# Patient Record
Sex: FEMALE | Race: WHITE | NOT HISPANIC OR LATINO | Employment: OTHER | URBAN - METROPOLITAN AREA
[De-identification: names, ages, dates, MRNs, and addresses within clinical notes are randomized per-mention and may not be internally consistent; named-entity substitution may affect disease eponyms.]

---

## 2018-11-01 ENCOUNTER — OFFICE VISIT (OUTPATIENT)
Dept: MEDICAL GROUP | Facility: MEDICAL CENTER | Age: 83
End: 2018-11-01
Payer: MEDICARE

## 2018-11-01 VITALS
WEIGHT: 134 LBS | OXYGEN SATURATION: 96 % | SYSTOLIC BLOOD PRESSURE: 128 MMHG | HEART RATE: 60 BPM | HEIGHT: 65 IN | RESPIRATION RATE: 16 BRPM | DIASTOLIC BLOOD PRESSURE: 82 MMHG | TEMPERATURE: 97.7 F | BODY MASS INDEX: 22.33 KG/M2

## 2018-11-01 DIAGNOSIS — I10 ESSENTIAL HYPERTENSION: ICD-10-CM

## 2018-11-01 DIAGNOSIS — R53.81 PHYSICAL DECONDITIONING: ICD-10-CM

## 2018-11-01 DIAGNOSIS — Z91.81 RISK FOR FALLS: ICD-10-CM

## 2018-11-01 DIAGNOSIS — Z76.89 ESTABLISHING CARE WITH NEW DOCTOR, ENCOUNTER FOR: ICD-10-CM

## 2018-11-01 DIAGNOSIS — Z23 NEED FOR VACCINATION: ICD-10-CM

## 2018-11-01 DIAGNOSIS — M17.0 PRIMARY OSTEOARTHRITIS OF BOTH KNEES: ICD-10-CM

## 2018-11-01 DIAGNOSIS — I87.2 VENOUS INSUFFICIENCY OF BOTH LOWER EXTREMITIES: ICD-10-CM

## 2018-11-01 DIAGNOSIS — Z86.79 HISTORY OF ATRIAL FIBRILLATION: ICD-10-CM

## 2018-11-01 DIAGNOSIS — R60.0 LOCALIZED EDEMA: ICD-10-CM

## 2018-11-01 DIAGNOSIS — Z86.69 H/O: BELL'S PALSY: ICD-10-CM

## 2018-11-01 DIAGNOSIS — N39.46 MIXED STRESS AND URGE URINARY INCONTINENCE: ICD-10-CM

## 2018-11-01 DIAGNOSIS — E03.9 ACQUIRED HYPOTHYROIDISM: ICD-10-CM

## 2018-11-01 PROCEDURE — G0008 ADMIN INFLUENZA VIRUS VAC: HCPCS | Performed by: INTERNAL MEDICINE

## 2018-11-01 PROCEDURE — 99203 OFFICE O/P NEW LOW 30 MIN: CPT | Mod: 25 | Performed by: INTERNAL MEDICINE

## 2018-11-01 PROCEDURE — 90662 IIV NO PRSV INCREASED AG IM: CPT | Performed by: INTERNAL MEDICINE

## 2018-11-01 RX ORDER — GABAPENTIN 300 MG/1
300 CAPSULE ORAL 2 TIMES DAILY
COMMUNITY
End: 2019-03-07 | Stop reason: SDUPTHER

## 2018-11-01 RX ORDER — LISINOPRIL 40 MG/1
40 TABLET ORAL DAILY
COMMUNITY
End: 2019-04-16 | Stop reason: SDUPTHER

## 2018-11-01 RX ORDER — METOPROLOL SUCCINATE 50 MG/1
50 TABLET, EXTENDED RELEASE ORAL DAILY
COMMUNITY
End: 2019-04-16 | Stop reason: SDUPTHER

## 2018-11-01 RX ORDER — LEVOTHYROXINE SODIUM 88 UG/1
88 TABLET ORAL
COMMUNITY
End: 2018-11-20

## 2018-11-01 RX ORDER — LATANOPROST 50 UG/ML
1 SOLUTION/ DROPS OPHTHALMIC NIGHTLY
COMMUNITY
End: 2018-11-01 | Stop reason: SDUPTHER

## 2018-11-01 RX ORDER — LATANOPROST 50 UG/ML
1 SOLUTION/ DROPS OPHTHALMIC DAILY
Qty: 1 BOTTLE | Refills: 3 | Status: SHIPPED | OUTPATIENT
Start: 2018-11-01 | End: 2019-06-11 | Stop reason: SDUPTHER

## 2018-11-01 RX ORDER — SIMVASTATIN 20 MG
20 TABLET ORAL NIGHTLY
COMMUNITY
End: 2018-11-01

## 2018-11-01 NOTE — PROGRESS NOTES
Subjective:   Tracie Arauz is a 87 y.o. female here today to establish care and             1. Establishing care with new doctor, encounter for    Patient moved from [place], [place] to live with her daughter 5 weeks ago.  She saw all of her physicians prior to leaving.  She lives in a one bedroom apartment, she does not drive.  She is fairly independent in that she is able to perform all of her ADLs without assistance, however she is not cooking or cleaning much.    2. Risk for falls    Patient has not fallen recently, however both her and her daughter states that she is unsteady on her feet.  She uses a cane to walk.  She used to attend senior exercise classes, however has not done so for the past couple of months.  It sounds like she sits on the couch most of the time.  They are both interested in a PT evaluation.    3. Primary osteoarthritis of both knees    Patient has fairly severe osteoarthritis in both knees.  She has never had surgery.  She was told that she should only take extra strength Tylenol by her previous physicians.    4. Essential hypertension    Patient has been taking Prinivil 40 mg daily, and metoprolol 50 mg daily for years.  Her blood pressure is controlled with this.    5. Acquired hypothyroidism    Patient has been on Synthroid 88 mcg daily for years, no history of thyroid surgery.    6. History of atrial fibrillation    Status post ablation about 10 years ago.  Has remained in sinus rhythm.    7. Mixed stress and urge urinary incontinence    Increasing problem over the last year, patient has both stress and urge incontinence.  She has never been evaluated for this.  She has to wear a pad chronically.    8. Localized edema    Increasing problem over the last several months.  She has puffy dependent edema that is worse when she travels and better when she elevates her legs.  No history of skin breakdown, kidney or liver disease    9. H/O: Bell's palsy    About 10 years ago, she feels like  her face might be a little more droopy the last couple of days    10. Physical deconditioning    See above    11. Venous insufficiency of both lower extremities    See above    12. Need for vaccination    Requesting flu shot      Current medicines (including changes today)  Current Outpatient Prescriptions   Medication Sig Dispense Refill   • levothyroxine (SYNTHROID) 88 MCG Tab Take 88 mcg by mouth Every morning on an empty stomach.     • lisinopril (PRINIVIL, ZESTRIL) 40 MG tablet Take 40 mg by mouth every day.     • metoprolol SR (TOPROL XL) 50 MG TABLET SR 24 HR Take 50 mg by mouth every day.     • gabapentin (NEURONTIN) 300 MG Cap Take 300 mg by mouth 2 Times a Day.     • latanoprost (XALATAN) 0.005 % Solution Place 1 Drop in both eyes every day. 1 Bottle 3     No current facility-administered medications for this visit.      She  has no past medical history on file.    Social History     Social History   • Marital status:      Spouse name: N/A   • Number of children: N/A   • Years of education: N/A     Social History Main Topics   • Smoking status: Former Smoker     Packs/day: 0.50     Quit date: 11/1/1985   • Smokeless tobacco: Never Used   • Alcohol use No   • Drug use: Unknown   • Sexual activity: No     Other Topics Concern   • Not on file     Social History Narrative   • No narrative on file     No family history on file.    ROS       - Constitutional: Negative for fever, chills, unexpected weight change, and fatigue/generalized weakness.     - HEENT: Negative for headaches, vision changes      - Respiratory: Negative for cough, Shortness of breath    - Cardiovascular: Negative for chest pain, positive chronic lower extremity edema  - Gastrointestinal: Negative for heartburn,  abdominal pain,  diarrhea, constipation     - Genitourinary: Negative for dysuria , positive urinary incontinence    - Musculoskeletal: Negative for  back pain bilateral knee pain, chronic  - Skin: Negative for rash     -  Neurological: Negative for dizziness,  tremors, focal weakness .  Had thorough neurological workup in the past for unclear reasons, all negative   - Psychiatric/Behavioral: Negative for depression and memory loss.             Objective:     Blood pressure 128/82, pulse 60, temperature 36.5 °C (97.7 °F), temperature source Temporal, resp. rate 16, SpO2 96 %, not currently breastfeeding. There is no height or weight on file to calculate BMI.   Physical Exam:  Constitutional: Alert, no distress.  Skin: Warm, dry, good turgor, no rashes in visible areas.  Eye: Equal, round and reactive, conjunctiva clear, lids normal.  ENMT: Lips without lesions, good dentition, oropharynx clear. Hearing grossly intact.  Neck: No masses, no thyromegaly. No cervical or supraclavicular lymphadenopathy  Respiratory: Unlabored respiratory effort, lungs clear to auscultation, no wheezes, no rhonchi.  Cardiovascular: Regular rate and rhythm, without murmur, no edema.  Abdomen: Soft, non-tender, no masses, no hepatosplenomegaly.  Extremities: Mild dependent rubor, 1-2+ puffy ankle and pedal edema pitting.  Bilateral arthritic changes of her knees  Psych: Alert and oriented x3, normal affect and mood. Insight and judgment good            Assessment and Plan:   The following treatment plan was discussed    1. Establishing care with new doctor, encounter for    Patient no longer requires mammograms, colonoscopy, Pap smears.  We discussed that she no longer needs to take her simvastatin for cholesterol lowering as primary prevention.  She is agreeable to discontinue    2. Risk for falls      - REFERRAL TO PHYSICAL THERAPY Reason for Therapy: Eval/Treat/Report    3. Primary osteoarthritis of both knees    We discussed exercising Tylenol, she also may investigate CBD oil or edible marijuana    4. Essential hypertension    -Controlled on current regimen  - COMP METABOLIC PANEL; Future    5. Acquired hypothyroidism      - TSH WITH REFLEX TO FT4;  Future    6. History of atrial fibrillation    No intervention or evaluation needed    7. Mixed stress and urge urinary incontinence      - REFERRAL TO UROGYNECOLOGY    8. Localized edema    I believe this is secondary to venous insufficiency, we will rule out other cause  - TSH WITH REFLEX TO FT4; Future  - CBC WITHOUT DIFFERENTIAL; Future    9. H/O: Bell's palsy        10. Physical deconditioning      - REFERRAL TO PHYSICAL THERAPY Reason for Therapy: Eval/Treat/Report    11. Venous insufficiency of both lower extremities    Patient has compression stockings at home, recommend that she use them    12. Need for vaccination    -Patient is up-to-date with her pneumonia vaccines, unfortunately forgot to enter them into chart.  - INFLUENZA VACCINE, HIGH DOSE (65+ ONLY)      Followup: Return in about 4 weeks (around 11/29/2018).

## 2018-11-19 ENCOUNTER — HOSPITAL ENCOUNTER (OUTPATIENT)
Dept: LAB | Facility: MEDICAL CENTER | Age: 83
End: 2018-11-19
Attending: INTERNAL MEDICINE
Payer: MEDICARE

## 2018-11-19 DIAGNOSIS — E03.9 ACQUIRED HYPOTHYROIDISM: ICD-10-CM

## 2018-11-19 DIAGNOSIS — R60.0 LOCALIZED EDEMA: ICD-10-CM

## 2018-11-19 DIAGNOSIS — I10 ESSENTIAL HYPERTENSION: ICD-10-CM

## 2018-11-19 LAB
ALBUMIN SERPL BCP-MCNC: 4.1 G/DL (ref 3.2–4.9)
ALBUMIN/GLOB SERPL: 1.5 G/DL
ALP SERPL-CCNC: 77 U/L (ref 30–99)
ALT SERPL-CCNC: 9 U/L (ref 2–50)
ANION GAP SERPL CALC-SCNC: 10 MMOL/L (ref 0–11.9)
AST SERPL-CCNC: 17 U/L (ref 12–45)
BILIRUB SERPL-MCNC: 0.3 MG/DL (ref 0.1–1.5)
BUN SERPL-MCNC: 22 MG/DL (ref 8–22)
CALCIUM SERPL-MCNC: 8.8 MG/DL (ref 8.5–10.5)
CHLORIDE SERPL-SCNC: 109 MMOL/L (ref 96–112)
CO2 SERPL-SCNC: 22 MMOL/L (ref 20–33)
CREAT SERPL-MCNC: 0.95 MG/DL (ref 0.5–1.4)
ERYTHROCYTE [DISTWIDTH] IN BLOOD BY AUTOMATED COUNT: 49 FL (ref 35.9–50)
GLOBULIN SER CALC-MCNC: 2.7 G/DL (ref 1.9–3.5)
GLUCOSE SERPL-MCNC: 79 MG/DL (ref 65–99)
HCT VFR BLD AUTO: 43 % (ref 37–47)
HGB BLD-MCNC: 12.9 G/DL (ref 12–16)
MCH RBC QN AUTO: 29.2 PG (ref 27–33)
MCHC RBC AUTO-ENTMCNC: 30 G/DL (ref 33.6–35)
MCV RBC AUTO: 97.3 FL (ref 81.4–97.8)
PLATELET # BLD AUTO: 294 K/UL (ref 164–446)
PMV BLD AUTO: 10.4 FL (ref 9–12.9)
POTASSIUM SERPL-SCNC: 3.8 MMOL/L (ref 3.6–5.5)
PROT SERPL-MCNC: 6.8 G/DL (ref 6–8.2)
RBC # BLD AUTO: 4.42 M/UL (ref 4.2–5.4)
SODIUM SERPL-SCNC: 141 MMOL/L (ref 135–145)
WBC # BLD AUTO: 5.5 K/UL (ref 4.8–10.8)

## 2018-11-19 PROCEDURE — 84443 ASSAY THYROID STIM HORMONE: CPT

## 2018-11-19 PROCEDURE — 84439 ASSAY OF FREE THYROXINE: CPT

## 2018-11-19 PROCEDURE — 80053 COMPREHEN METABOLIC PANEL: CPT

## 2018-11-19 PROCEDURE — 85027 COMPLETE CBC AUTOMATED: CPT

## 2018-11-19 PROCEDURE — 36415 COLL VENOUS BLD VENIPUNCTURE: CPT

## 2018-11-20 ENCOUNTER — TELEPHONE (OUTPATIENT)
Dept: MEDICAL GROUP | Facility: MEDICAL CENTER | Age: 83
End: 2018-11-20

## 2018-11-20 LAB
T4 FREE SERPL-MCNC: 0.51 NG/DL (ref 0.53–1.43)
TSH SERPL DL<=0.005 MIU/L-ACNC: 100.26 UIU/ML (ref 0.38–5.33)

## 2018-11-20 RX ORDER — LEVOTHYROXINE SODIUM 0.12 MG/1
125 TABLET ORAL
Qty: 90 TAB | Refills: 1 | Status: SHIPPED | OUTPATIENT
Start: 2018-11-20 | End: 2018-12-13 | Stop reason: SDUPTHER

## 2018-11-20 NOTE — PROGRESS NOTES
Tyler Hodges,  Your lab work shows that your thyroid is significantly under replaced.  We should increase the dose of your thyroid medication.  You should be taking this on an empty stomach in the morning prior to breakfast.  I will call a different strength in to your pharmacy.  See you next week.  Betty Cedeno M.D. covering for Betty Cedeno M.D.

## 2018-11-29 ENCOUNTER — APPOINTMENT (OUTPATIENT)
Dept: MEDICAL GROUP | Facility: MEDICAL CENTER | Age: 83
End: 2018-11-29
Payer: MEDICARE

## 2018-12-13 ENCOUNTER — OFFICE VISIT (OUTPATIENT)
Dept: MEDICAL GROUP | Facility: MEDICAL CENTER | Age: 83
End: 2018-12-13
Payer: MEDICARE

## 2018-12-13 VITALS
TEMPERATURE: 97.9 F | HEART RATE: 64 BPM | BODY MASS INDEX: 22.59 KG/M2 | WEIGHT: 135.6 LBS | HEIGHT: 65 IN | DIASTOLIC BLOOD PRESSURE: 90 MMHG | OXYGEN SATURATION: 97 % | SYSTOLIC BLOOD PRESSURE: 150 MMHG

## 2018-12-13 DIAGNOSIS — E03.9 ACQUIRED HYPOTHYROIDISM: ICD-10-CM

## 2018-12-13 DIAGNOSIS — R53.81 PHYSICAL DECONDITIONING: ICD-10-CM

## 2018-12-13 PROCEDURE — 99213 OFFICE O/P EST LOW 20 MIN: CPT | Performed by: INTERNAL MEDICINE

## 2018-12-13 RX ORDER — LEVOTHYROXINE SODIUM 0.12 MG/1
125 TABLET ORAL
Qty: 90 TAB | Refills: 1 | Status: SHIPPED | OUTPATIENT
Start: 2018-12-13 | End: 2019-06-11 | Stop reason: SDUPTHER

## 2018-12-13 ASSESSMENT — PATIENT HEALTH QUESTIONNAIRE - PHQ9
5. POOR APPETITE OR OVEREATING: 0 - NOT AT ALL
CLINICAL INTERPRETATION OF PHQ2 SCORE: 3
SUM OF ALL RESPONSES TO PHQ QUESTIONS 1-9: 9

## 2018-12-14 NOTE — PROGRESS NOTES
Chief Complaint   Patient presents with   • Results     labs     Chief complaint: One month follow-up of new patient visit, hypothyroidism.    HISTORY OF PRESENT ILLNESS: Patient is a 87 y.o. female patient who presents today to discuss the evaluation and management of:          1. Acquired hypothyroidism    Results for EDIL ZIMMER (MRN [id number]) as of 12/13/2018 17:53   Ref. Range 11/19/2018 13:09   TSH Latest Ref Range: 0.380 - 5.330 uIU/mL 100.260 (H)   Free T-4 Latest Ref Range: 0.53 - 1.43 ng/dL 0.51 (L)     Patient has been taking Synthroid 88 mcg daily.  She states she has been compliant and takes it on an empty stomach in the morning.  She does have quite a few left in her pill bottle making compliance questionable in my mind.  I had called in increased dose to her pharmacy, however they were not notified and did not pick it up.  Patient is complaining of significant fatigue.    2. Physical deconditioning    Since moving from [place] a couple months ago, patient has not been able to go out much.  She lives in her own small apartment and does not drive.  Her daughter is looking into resources in the community for water aerobics, senior classes, etc.  She has form she would like for me to fill out for [employer] placard and senior transportation Access.        Patient Active Problem List    Diagnosis Date Noted   • Risk for falls 11/01/2018   • Primary osteoarthritis of both knees 11/01/2018   • Essential hypertension 11/01/2018   • Acquired hypothyroidism 11/01/2018   • History of atrial fibrillation 11/01/2018   • Mixed stress and urge urinary incontinence 11/01/2018   • Localized edema 11/01/2018   • H/O: Bell's palsy 11/01/2018   • Physical deconditioning 11/01/2018   • Venous insufficiency of both lower extremities 11/01/2018        Allergies:Duricef [cefadroxil]    Current meds including changes today  Current Outpatient Prescriptions   Medication Sig Dispense Refill   • levothyroxine (SYNTHROID) 125 MCG Tab  "Take 1 Tab by mouth Every morning on an empty stomach. 90 Tab 1   • lisinopril (PRINIVIL, ZESTRIL) 40 MG tablet Take 40 mg by mouth every day.     • metoprolol SR (TOPROL XL) 50 MG TABLET SR 24 HR Take 50 mg by mouth every day.     • gabapentin (NEURONTIN) 300 MG Cap Take 300 mg by mouth 2 Times a Day.     • latanoprost (XALATAN) 0.005 % Solution Place 1 Drop in both eyes every day. 1 Bottle 3     No current facility-administered medications for this visit.      Social History   Substance Use Topics   • Smoking status: Former Smoker     Packs/day: 0.50     Quit date: 11/1/1985   • Smokeless tobacco: Never Used   • Alcohol use No     Social History     Social History Narrative   • No narrative on file       No family history on file.    Review of Systems:  No chest pain, No shortness of breath, No dyspnea on exertion  Gastrointestinal ROS: No abdominal pain, No nausea, vomiting, diarrhea, or constipation        Exam:      Blood pressure 150/90, pulse 64, temperature 36.6 °C (97.9 °F), temperature source Temporal, height 1.638 m (5' 4.5\"), weight 61.5 kg (135 lb 9.6 oz), SpO2 97 %, not currently breastfeeding.  General:  Well nourished, well developed female in NAD affect and mood within normal limits  Head is grossly normal.  Neck: Supple without adenopathy  Pulmonary: Clear to ausculation.  Normal effort. No rales, rhonchi, or wheezing.  Cardiovascular: Regular rate and rhythm without murmur.   Extremities: no clubbing, cyanosis, or edema.  Mild ankle edema left greater than right no pitting  Neuro: moves all extremities symmetrically    Please note that this dictation was created using voice recognition software. I have made every reasonable attempt to correct obvious errors, but I expect that there are errors of grammar and possibly content that I did not discover before finalizing the note.    Assessment/Plan:  1. Acquired hypothyroidism    -Suggested patient get a pillbox so that she can place her pills out for " the week, less chance of missing doses.  - levothyroxine (SYNTHROID) 125 MCG Tab; Take 1 Tab by mouth Every morning on an empty stomach.  Dispense: 90 Tab; Refill: 1  - TSH WITH REFLEX TO FT4; Future in 6 weeks    2. Physical deconditioning    -[employer] form filled out for placard, patient walks with cane and is unable to walk greater than 50 feet without stopping to rest.  She was also referred to outpatient PT but this is on hold until she can obtain reliable transportation.    Followup: Return in about 3 months (around 3/13/2019).

## 2019-03-07 ENCOUNTER — OFFICE VISIT (OUTPATIENT)
Dept: MEDICAL GROUP | Facility: MEDICAL CENTER | Age: 84
End: 2019-03-07
Payer: MEDICARE

## 2019-03-07 VITALS
OXYGEN SATURATION: 98 % | SYSTOLIC BLOOD PRESSURE: 148 MMHG | TEMPERATURE: 97.8 F | HEART RATE: 60 BPM | BODY MASS INDEX: 22.92 KG/M2 | DIASTOLIC BLOOD PRESSURE: 82 MMHG | HEIGHT: 65 IN | RESPIRATION RATE: 16 BRPM

## 2019-03-07 DIAGNOSIS — M89.9 OSTEOPATHY: ICD-10-CM

## 2019-03-07 DIAGNOSIS — I87.2 VENOUS INSUFFICIENCY OF BOTH LOWER EXTREMITIES: ICD-10-CM

## 2019-03-07 DIAGNOSIS — I10 ESSENTIAL HYPERTENSION: ICD-10-CM

## 2019-03-07 DIAGNOSIS — E03.9 ACQUIRED HYPOTHYROIDISM: ICD-10-CM

## 2019-03-07 DIAGNOSIS — M17.0 PRIMARY OSTEOARTHRITIS OF BOTH KNEES: ICD-10-CM

## 2019-03-07 DIAGNOSIS — L97.912 ULCER OF RIGHT LOWER EXTREMITY WITH FAT LAYER EXPOSED (HCC): ICD-10-CM

## 2019-03-07 PROBLEM — R60.0 LOCALIZED EDEMA: Status: RESOLVED | Noted: 2018-11-01 | Resolved: 2019-03-07

## 2019-03-07 PROCEDURE — 99214 OFFICE O/P EST MOD 30 MIN: CPT | Performed by: INTERNAL MEDICINE

## 2019-03-07 RX ORDER — GABAPENTIN 300 MG/1
300 CAPSULE ORAL 3 TIMES DAILY
Qty: 90 CAP | Refills: 3 | Status: SHIPPED | OUTPATIENT
Start: 2019-03-07 | End: 2019-06-11 | Stop reason: SDUPTHER

## 2019-03-07 ASSESSMENT — PATIENT HEALTH QUESTIONNAIRE - PHQ9
5. POOR APPETITE OR OVEREATING: 0 - NOT AT ALL
CLINICAL INTERPRETATION OF PHQ2 SCORE: 3
SUM OF ALL RESPONSES TO PHQ QUESTIONS 1-9: 6

## 2019-03-08 NOTE — PROGRESS NOTES
Chief Complaint   Patient presents with   • Hypertension     follow up     Chief complaint: 3-month follow-up of hypertension, new leg wound    HISTORY OF PRESENT ILLNESS: Patient is a 87 y.o. female patient who presents today to discuss the evaluation and management of:          1. Acquired hypothyroidism    Patient had TSH of 100 in November 2018, compliance is uncertain, but her Synthroid dose was increased  to125 mcg daily.  She states she is taking it faithfully.  Her energy level has improved, and her weight has dropped a little.    2. Primary osteoarthritis of both knees    Patient is taking Neurontin 300 mg twice daily, she would like to increase it as she feels it is helpful.  She also takes Tylenol as needed.    3. Essential hypertension    Patient is on lisinopril 40 mg daily, and Toprol-XL 50 mg daily.  Her blood pressure is minimally elevated today.    4. Ulcer of right lower extremity with fat layer exposed (HCC)    Patient struck her right shin on her bed rail 2 weeks ago.  She initially had significant swelling and ecchymosis, this has decreased.  She has a open sore on her shin that she has been applying antibacterial ointment and an occlusive dressing.    5. Venous insufficiency of both lower extremities    Chronic venous insufficiency, she had saline injection many years ago.  She continues to have chronic puffy ankle and pedal edema    6. Osteopathy            Patient Active Problem List    Diagnosis Date Noted   • Ulcer of right lower extremity with fat layer exposed (HCC) 03/07/2019   • Osteopathy 03/07/2019   • Risk for falls 11/01/2018   • Primary osteoarthritis of both knees 11/01/2018   • Essential hypertension 11/01/2018   • Acquired hypothyroidism 11/01/2018   • History of atrial fibrillation 11/01/2018   • Mixed stress and urge urinary incontinence 11/01/2018   • H/O: Bell's palsy 11/01/2018   • Physical deconditioning 11/01/2018   • Venous insufficiency of both lower extremities  "11/01/2018        Allergies:Duricef [cefadroxil]    Current meds including changes today  Current Outpatient Prescriptions   Medication Sig Dispense Refill   • gabapentin (NEURONTIN) 300 MG Cap Take 1 Cap by mouth 3 times a day. 90 Cap 3   • levothyroxine (SYNTHROID) 125 MCG Tab Take 1 Tab by mouth Every morning on an empty stomach. 90 Tab 1   • lisinopril (PRINIVIL, ZESTRIL) 40 MG tablet Take 40 mg by mouth every day.     • metoprolol SR (TOPROL XL) 50 MG TABLET SR 24 HR Take 50 mg by mouth every day.     • latanoprost (XALATAN) 0.005 % Solution Place 1 Drop in both eyes every day. 1 Bottle 3     No current facility-administered medications for this visit.      Social History   Substance Use Topics   • Smoking status: Former Smoker     Packs/day: 0.50     Quit date: 11/1/1985   • Smokeless tobacco: Never Used   • Alcohol use No     Social History     Social History Narrative   • No narrative on file       No family history on file.    Review of Systems:  No chest pain, No shortness of breath, No dyspnea on exertion  Gastrointestinal ROS: No abdominal pain, No nausea, vomiting, diarrhea, or constipation        Exam:      Blood pressure 148/82, pulse 60, temperature 36.6 °C (97.8 °F), temperature source Temporal, resp. rate 16, height 1.638 m (5' 4.5\"), SpO2 98 %, not currently breastfeeding.  General:  Well nourished, well developed female in NAD affect and mood within normal limits  Head is grossly normal.  Neck: Supple without adenopathy  Pulmonary: Clear to ausculation.  Normal effort. No rales, rhonchi, or wheezing.  Cardiovascular: Regular rate and rhythm without murmur.   Extremities: 2+ ankle and pretibial edema with rubor and dilated varicose veins.  2 cm ulcer right shin with mildly purulent drainage.  No surrounding cellulitis  Neuro: moves all extremities symmetrically    Please note that this dictation was created using voice recognition software. I have made every reasonable attempt to correct obvious " errors, but I expect that there are errors of grammar and possibly content that I did not discover before finalizing the note.    Assessment/Plan:  1. Acquired hypothyroidism    Continue Synthroid 125 mcg  - TSH WITH REFLEX TO FT4; Future    2. Primary osteoarthritis of both knees    Okay to increase Neurontin to 3 times daily    3. Essential hypertension    Stable, continue current medication    4. Ulcer of right lower extremity with fat layer exposed (HCC)    Patient was advised she should apply nonocclusive dressing, and stop putting ointment on it so that it can dry and make a scab.  If he does not have improvement in it in the next 10 days, I recommend wound care.  She would need to have home health that she is unable to drive.    5. Venous insufficiency of both lower extremities    Patient has compression stockings at home, I recommend that she wear them following healing of her wound    6. Osteopathy    -No history of fracture or osteoporosis, however she is at risk for fall and would benefit from treatment if she was osteoporotic  - DS-BONE DENSITY STUDY (DEXA); Future    Followup: Return in about 3 months (around 6/7/2019).

## 2019-03-25 ENCOUNTER — HOSPITAL ENCOUNTER (OUTPATIENT)
Dept: RADIOLOGY | Facility: MEDICAL CENTER | Age: 84
End: 2019-03-25
Attending: INTERNAL MEDICINE
Payer: MEDICARE

## 2019-03-25 DIAGNOSIS — M89.9 OSTEOPATHY: ICD-10-CM

## 2019-03-25 PROCEDURE — 77080 DXA BONE DENSITY AXIAL: CPT

## 2019-03-26 ENCOUNTER — TELEPHONE (OUTPATIENT)
Dept: MEDICAL GROUP | Facility: MEDICAL CENTER | Age: 84
End: 2019-03-26

## 2019-03-26 RX ORDER — ALENDRONATE SODIUM 70 MG/1
70 TABLET ORAL
Qty: 4 TAB | Refills: 5 | Status: SHIPPED | OUTPATIENT
Start: 2019-03-26 | End: 2019-06-11 | Stop reason: SDUPTHER

## 2019-03-27 NOTE — PROGRESS NOTES
Tyler Hodges,  Your bone density scan shows that you have osteopenia with a high risk for fracture.  You may benefit from taking a medication such as Fosamax which is a pill you take once a week.  We can discuss when I see you in June, but in the meantime I will call this into your pharmacy so that you can start taking it if you are willing.  Betty Cedeno MD

## 2019-06-11 ENCOUNTER — OFFICE VISIT (OUTPATIENT)
Dept: MEDICAL GROUP | Facility: MEDICAL CENTER | Age: 84
End: 2019-06-11
Payer: MEDICARE

## 2019-06-11 VITALS
WEIGHT: 129 LBS | OXYGEN SATURATION: 96 % | RESPIRATION RATE: 14 BRPM | DIASTOLIC BLOOD PRESSURE: 80 MMHG | TEMPERATURE: 98.6 F | BODY MASS INDEX: 21.49 KG/M2 | HEIGHT: 65 IN | SYSTOLIC BLOOD PRESSURE: 132 MMHG | HEART RATE: 64 BPM

## 2019-06-11 DIAGNOSIS — M17.0 PRIMARY OSTEOARTHRITIS OF BOTH KNEES: ICD-10-CM

## 2019-06-11 DIAGNOSIS — I10 ESSENTIAL HYPERTENSION: ICD-10-CM

## 2019-06-11 DIAGNOSIS — H40.10X0 OPEN-ANGLE GLAUCOMA OF BOTH EYES, UNSPECIFIED GLAUCOMA STAGE, UNSPECIFIED OPEN-ANGLE GLAUCOMA TYPE: ICD-10-CM

## 2019-06-11 DIAGNOSIS — M85.852 OSTEOPENIA OF NECK OF LEFT FEMUR: ICD-10-CM

## 2019-06-11 DIAGNOSIS — E03.9 ACQUIRED HYPOTHYROIDISM: ICD-10-CM

## 2019-06-11 PROBLEM — L97.912 ULCER OF RIGHT LOWER EXTREMITY WITH FAT LAYER EXPOSED (HCC): Status: RESOLVED | Noted: 2019-03-07 | Resolved: 2019-06-11

## 2019-06-11 PROCEDURE — 99214 OFFICE O/P EST MOD 30 MIN: CPT | Performed by: INTERNAL MEDICINE

## 2019-06-11 RX ORDER — ALENDRONATE SODIUM 70 MG/1
70 TABLET ORAL
Qty: 12 TAB | Refills: 1 | Status: SHIPPED | OUTPATIENT
Start: 2019-06-11 | End: 2023-08-24

## 2019-06-11 RX ORDER — METOPROLOL SUCCINATE 50 MG/1
50 TABLET, EXTENDED RELEASE ORAL DAILY
Qty: 90 TAB | Refills: 2 | Status: SHIPPED | OUTPATIENT
Start: 2019-06-11 | End: 2022-07-13

## 2019-06-11 RX ORDER — GABAPENTIN 300 MG/1
300 CAPSULE ORAL 2 TIMES DAILY PRN
Qty: 180 CAP | Refills: 2 | Status: SHIPPED | OUTPATIENT
Start: 2019-06-11 | End: 2023-08-24

## 2019-06-11 RX ORDER — LISINOPRIL 40 MG/1
40 TABLET ORAL DAILY
Qty: 90 TAB | Refills: 2 | Status: SHIPPED | OUTPATIENT
Start: 2019-06-11 | End: 2022-07-13

## 2019-06-11 RX ORDER — LATANOPROST 50 UG/ML
1 SOLUTION/ DROPS OPHTHALMIC DAILY
Qty: 1 BOTTLE | Refills: 3 | Status: SHIPPED | OUTPATIENT
Start: 2019-06-11 | End: 2019-08-27 | Stop reason: SDUPTHER

## 2019-06-11 RX ORDER — LEVOTHYROXINE SODIUM 0.12 MG/1
125 TABLET ORAL
Qty: 90 TAB | Refills: 1 | Status: SHIPPED | OUTPATIENT
Start: 2019-06-11 | End: 2023-04-27

## 2019-06-12 NOTE — PROGRESS NOTES
Chief Complaint   Patient presents with   • Hypothyroidism     3 month follow up      Chief complaint: 3-month follow-up of hypothyroid, hypertension, arthritis  HISTORY OF PRESENT ILLNESS: Patient is a 87 y.o. female patient who presents today to discuss the evaluation and management of:    Patient is generally doing well, she has become more active in the community.  She is attending senior outings, and classes.  She has been taking public transportation or taxis.      1. Essential hypertension    Patient's blood pressure is controlled on lisinopril and metoprolol.  She is having no side effects from the medication.  No dizziness.  She has mild lower extremity edema which has been stable.    2. Acquired hypothyroidism    Patient was significantly hypothyroid at the time of her new patient visit in December 2018.  At that time, she was on 88 mcg of levothyroxine.  I did question her compliance, as she seemed to have a lot of pills in the bottle left over.  She currently is taking 125 mcg daily, and is due for a repeat TSH.    3. Primary osteoarthritis of both knees    Patient continues to complain of knee pain.  She has fairly severe osteoarthritis in both knees.  Her right knee is fairly swollen.  In the past, she saw a rheumatologist for joint injection.  She remains fairly inactive, but is going to start exercising in the pool at her apartment complex.  We discussed that if she wanted an injection, we would refer her to orthopedics, rheumatologist here are scarce and backed up.    4. Open-angle glaucoma of both eyes, unspecified glaucoma stage, unspecified open-angle glaucoma type    Patient is requesting a refill on her Xalatan.  She has not yet established with a local ophthalmologist.    5. Osteopenia of neck of left femur    Patient had DEXA scan done in March which revealed with T score -2.1 and left femur, her 10-year fracture risk score was 28%.  I started her on Fosamax 70 mg weekly, she has been  "compliant with this and has not had any side effects.        Patient Active Problem List    Diagnosis Date Noted   • Osteopenia of neck of left femur 03/07/2019   • Risk for falls 11/01/2018   • Primary osteoarthritis of both knees 11/01/2018   • Essential hypertension 11/01/2018   • Acquired hypothyroidism 11/01/2018   • History of atrial fibrillation 11/01/2018   • Mixed stress and urge urinary incontinence 11/01/2018   • H/O: Bell's palsy 11/01/2018   • Physical deconditioning 11/01/2018   • Venous insufficiency of both lower extremities 11/01/2018        Allergies:Duricef [cefadroxil]    Current meds including changes today  Current Outpatient Prescriptions   Medication Sig Dispense Refill   • alendronate (FOSAMAX) 70 MG Tab Take 1 Tab by mouth every 7 days. 12 Tab 1   • gabapentin (NEURONTIN) 300 MG Cap Take 1 Cap by mouth 2 times a day as needed. 180 Cap 2   • lisinopril (PRINIVIL, ZESTRIL) 40 MG tablet Take 1 Tab by mouth every day. 90 Tab 2   • metoprolol SR (TOPROL XL) 50 MG TABLET SR 24 HR Take 1 Tab by mouth every day. 90 Tab 2   • latanoprost (XALATAN) 0.005 % Solution Place 1 Drop in both eyes every day. 1 Bottle 3   • levothyroxine (SYNTHROID) 125 MCG Tab Take 1 Tab by mouth Every morning on an empty stomach. 90 Tab 1     No current facility-administered medications for this visit.      Social History   Substance Use Topics   • Smoking status: Former Smoker     Packs/day: 0.50     Quit date: 11/1/1985   • Smokeless tobacco: Never Used   • Alcohol use No     Social History     Social History Narrative   • No narrative on file       No family history on file.          Exam:      /80 (BP Location: Left arm, Patient Position: Sitting, BP Cuff Size: Adult)   Pulse 64   Temp 37 °C (98.6 °F) (Temporal)   Resp 14   Ht 1.638 m (5' 4.5\")   Wt 58.5 kg (129 lb)   SpO2 96%   General:  Well nourished, well developed female in NAD affect and mood within normal limits.  Pleasant, alert  Head is grossly " normal.  Neck: Supple without adenopathy  Pulmonary: Clear to ausculation.  Normal effort. No rales, rhonchi, or wheezing.  Cardiovascular: Regular rate and rhythm without murmur.   Extremities: no clubbing, cyanosis, 1+ puffy ankle and pretibial edema.  Scar from healed wound from several months ago.  Both knees with swelling, and deformity due to OA.  Neuro: moves all extremities symmetrically    Please note that this dictation was created using voice recognition software. I have made every reasonable attempt to correct obvious errors, but I expect that there are errors of grammar and possibly content that I did not discover before finalizing the note.    Assessment/Plan:  1. Essential hypertension    -BP controlled, continue current regimen  - lisinopril (PRINIVIL, ZESTRIL) 40 MG tablet; Take 1 Tab by mouth every day.  Dispense: 90 Tab; Refill: 2  - metoprolol SR (TOPROL XL) 50 MG TABLET SR 24 HR; Take 1 Tab by mouth every day.  Dispense: 90 Tab; Refill: 2    2. Acquired hypothyroidism    -Will advise patient if she needs adjustment in her levothyroxine dose  - levothyroxine (SYNTHROID) 125 MCG Tab; Take 1 Tab by mouth Every morning on an empty stomach.  Dispense: 90 Tab; Refill: 1  - TSH WITH REFLEX TO FT4; Future    3. Primary osteoarthritis of both knees    -Patient advised that we could do referral to orthopedics if she desires evaluation for joint injection,   - gabapentin (NEURONTIN) 300 MG Cap; Take 1 Cap by mouth 2 times a day as needed.  Dispense: 180 Cap; Refill: 2    4. Open-angle glaucoma of both eyes, unspecified glaucoma stage, unspecified open-angle glaucoma type      - latanoprost (XALATAN) 0.005 % Solution; Place 1 Drop in both eyes every day.  Dispense: 1 Bottle; Refill: 3  - REFERRAL TO OPHTHALMOLOGY    5. Osteopenia of neck of left femur      - alendronate (FOSAMAX) 70 MG Tab; Take 1 Tab by mouth every 7 days.  Dispense: 12 Tab; Refill: 1    Followup: Recommend 6-month follow-up with new PCP,  patient may go to [facility], or return to this clinic.

## 2019-06-14 ENCOUNTER — HOSPITAL ENCOUNTER (OUTPATIENT)
Dept: LAB | Facility: MEDICAL CENTER | Age: 84
End: 2019-06-14
Attending: INTERNAL MEDICINE
Payer: MEDICARE

## 2019-06-14 DIAGNOSIS — E03.9 ACQUIRED HYPOTHYROIDISM: ICD-10-CM

## 2019-06-14 PROCEDURE — 84443 ASSAY THYROID STIM HORMONE: CPT

## 2019-06-14 PROCEDURE — 36415 COLL VENOUS BLD VENIPUNCTURE: CPT

## 2019-06-14 PROCEDURE — 84439 ASSAY OF FREE THYROXINE: CPT

## 2019-06-15 LAB
T4 FREE SERPL-MCNC: 0.72 NG/DL (ref 0.53–1.43)
TSH SERPL DL<=0.005 MIU/L-ACNC: 35.29 UIU/ML (ref 0.38–5.33)

## 2019-06-18 NOTE — PROGRESS NOTES
Hi Tracie,  Your thyroid level looks better,l but it looks like you are not taking your medication every day. Please try to make sure you are taking it every morning.  Betty Cedeno MD

## 2019-08-26 DIAGNOSIS — H40.10X0 OPEN-ANGLE GLAUCOMA OF BOTH EYES, UNSPECIFIED GLAUCOMA STAGE, UNSPECIFIED OPEN-ANGLE GLAUCOMA TYPE: ICD-10-CM

## 2019-08-27 RX ORDER — LATANOPROST 50 UG/ML
1 SOLUTION/ DROPS OPHTHALMIC DAILY
Qty: 1 BOTTLE | Refills: 1 | Status: SHIPPED | OUTPATIENT
Start: 2019-08-27 | End: 2023-08-24

## 2021-01-14 DIAGNOSIS — Z23 NEED FOR VACCINATION: ICD-10-CM

## 2022-07-05 PROBLEM — G31.84 MILD COGNITIVE IMPAIRMENT: Status: ACTIVE | Noted: 2022-07-05

## 2022-07-05 PROBLEM — I25.10 CAD (CORONARY ARTERY DISEASE): Status: ACTIVE | Noted: 2022-07-05

## 2022-07-05 PROBLEM — Z87.440 HISTORY OF UTI: Status: ACTIVE | Noted: 2022-07-05

## 2022-07-05 PROBLEM — Z91.81 PERSONAL HISTORY OF FALL: Status: ACTIVE | Noted: 2022-07-05

## 2022-07-05 PROBLEM — D68.318 CIRCULATING ANTICOAGULANT DISORDER (HCC): Status: ACTIVE | Noted: 2022-07-05

## 2022-07-05 PROBLEM — I48.11 LONGSTANDING PERSISTENT ATRIAL FIBRILLATION (HCC): Status: ACTIVE | Noted: 2022-07-05

## 2022-08-26 PROBLEM — K59.1 FUNCTIONAL DIARRHEA: Status: ACTIVE | Noted: 2022-08-26

## 2022-09-05 PROBLEM — F32.9 REACTIVE DEPRESSION: Status: ACTIVE | Noted: 2022-09-05

## 2022-12-27 PROBLEM — N30.00 ACUTE CYSTITIS WITHOUT HEMATURIA: Status: ACTIVE | Noted: 2022-12-27

## 2023-04-27 PROBLEM — H10.33 ACUTE CONJUNCTIVITIS OF BOTH EYES: Status: ACTIVE | Noted: 2023-04-27

## 2023-04-27 PROBLEM — L98.9 SKIN LESION OF NECK: Status: ACTIVE | Noted: 2023-04-27

## 2023-06-18 PROBLEM — H02.135 SENILE ECTROPION OF BOTH LOWER EYELIDS: Status: ACTIVE | Noted: 2023-06-18

## 2023-06-18 PROBLEM — H02.132 SENILE ECTROPION OF BOTH LOWER EYELIDS: Status: ACTIVE | Noted: 2023-06-18

## 2023-06-18 PROBLEM — N30.00 ACUTE CYSTITIS WITHOUT HEMATURIA: Status: RESOLVED | Noted: 2022-12-27 | Resolved: 2023-06-18

## 2023-06-18 PROBLEM — H10.33 ACUTE CONJUNCTIVITIS OF BOTH EYES: Status: RESOLVED | Noted: 2023-04-27 | Resolved: 2023-06-18

## 2023-08-09 ENCOUNTER — APPOINTMENT (OUTPATIENT)
Dept: RADIOLOGY | Facility: MEDICAL CENTER | Age: 88
End: 2023-08-09
Attending: STUDENT IN AN ORGANIZED HEALTH CARE EDUCATION/TRAINING PROGRAM
Payer: MEDICARE

## 2023-08-09 ENCOUNTER — HOSPITAL ENCOUNTER (EMERGENCY)
Facility: MEDICAL CENTER | Age: 88
End: 2023-08-10
Attending: STUDENT IN AN ORGANIZED HEALTH CARE EDUCATION/TRAINING PROGRAM
Payer: MEDICARE

## 2023-08-09 DIAGNOSIS — N30.01 ACUTE CYSTITIS WITH HEMATURIA: ICD-10-CM
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
## 2023-08-09 DIAGNOSIS — M25.531 RIGHT WRIST PAIN: ICD-10-CM

## 2023-08-09 LAB
ALBUMIN SERPL BCP-MCNC: 4 G/DL (ref 3.2–4.9)
ALBUMIN/GLOB SERPL: 1.3 G/DL
ALP SERPL-CCNC: 101 U/L (ref 30–99)
ALT SERPL-CCNC: 14 U/L (ref 2–50)
ANION GAP SERPL CALC-SCNC: 11 MMOL/L (ref 7–16)
APPEARANCE UR: ABNORMAL
AST SERPL-CCNC: 17 U/L (ref 12–45)
BACTERIA #/AREA URNS HPF: ABNORMAL /HPF
BASOPHILS # BLD AUTO: 0.2 % (ref 0–1.8)
BASOPHILS # BLD: 0.02 K/UL (ref 0–0.12)
BILIRUB SERPL-MCNC: 0.4 MG/DL (ref 0.1–1.5)
BILIRUB UR QL STRIP.AUTO: NEGATIVE
BUN SERPL-MCNC: 32 MG/DL (ref 8–22)
CALCIUM ALBUM COR SERPL-MCNC: 9.2 MG/DL (ref 8.5–10.5)
CALCIUM SERPL-MCNC: 9.2 MG/DL (ref 8.5–10.5)
CHLORIDE SERPL-SCNC: 104 MMOL/L (ref 96–112)
CO2 SERPL-SCNC: 23 MMOL/L (ref 20–33)
COLOR UR: YELLOW
CREAT SERPL-MCNC: 1.11 MG/DL (ref 0.5–1.4)
EOSINOPHIL # BLD AUTO: 0.04 K/UL (ref 0–0.51)
EOSINOPHIL NFR BLD: 0.3 % (ref 0–6.9)
EPI CELLS #/AREA URNS HPF: NEGATIVE /HPF
ERYTHROCYTE [DISTWIDTH] IN BLOOD BY AUTOMATED COUNT: 44 FL (ref 35.9–50)
GFR SERPLBLD CREATININE-BSD FMLA CKD-EPI: 47 ML/MIN/1.73 M 2
GLOBULIN SER CALC-MCNC: 3 G/DL (ref 1.9–3.5)
GLUCOSE SERPL-MCNC: 118 MG/DL (ref 65–99)
GLUCOSE UR STRIP.AUTO-MCNC: NEGATIVE MG/DL
HCT VFR BLD AUTO: 37.3 % (ref 37–47)
HGB BLD-MCNC: 12 G/DL (ref 12–16)
HYALINE CASTS #/AREA URNS LPF: ABNORMAL /LPF
IMM GRANULOCYTES # BLD AUTO: 0.06 K/UL (ref 0–0.11)
IMM GRANULOCYTES NFR BLD AUTO: 0.5 % (ref 0–0.9)
KETONES UR STRIP.AUTO-MCNC: ABNORMAL MG/DL
LEUKOCYTE ESTERASE UR QL STRIP.AUTO: ABNORMAL
LYMPHOCYTES # BLD AUTO: 1.07 K/UL (ref 1–4.8)
LYMPHOCYTES NFR BLD: 8.9 % (ref 22–41)
MCH RBC QN AUTO: 30.6 PG (ref 27–33)
MCHC RBC AUTO-ENTMCNC: 32.2 G/DL (ref 32.2–35.5)
MCV RBC AUTO: 95.2 FL (ref 81.4–97.8)
MICRO URNS: ABNORMAL
MONOCYTES # BLD AUTO: 0.63 K/UL (ref 0–0.85)
MONOCYTES NFR BLD AUTO: 5.3 % (ref 0–13.4)
NEUTROPHILS # BLD AUTO: 10.17 K/UL (ref 1.82–7.42)
NEUTROPHILS NFR BLD: 84.8 % (ref 44–72)
NITRITE UR QL STRIP.AUTO: NEGATIVE
NRBC # BLD AUTO: 0 K/UL
NRBC BLD-RTO: 0 /100 WBC (ref 0–0.2)
PH UR STRIP.AUTO: 7 [PH] (ref 5–8)
PLATELET # BLD AUTO: 333 K/UL (ref 164–446)
PMV BLD AUTO: 9.7 FL (ref 9–12.9)
POTASSIUM SERPL-SCNC: 4.3 MMOL/L (ref 3.6–5.5)
PROT SERPL-MCNC: 7 G/DL (ref 6–8.2)
PROT UR QL STRIP: 100 MG/DL
RBC # BLD AUTO: 3.92 M/UL (ref 4.2–5.4)
RBC # URNS HPF: ABNORMAL /HPF
RBC UR QL AUTO: ABNORMAL
SODIUM SERPL-SCNC: 138 MMOL/L (ref 135–145)
SP GR UR STRIP.AUTO: 1.02
UROBILINOGEN UR STRIP.AUTO-MCNC: 0.2 MG/DL
WBC # BLD AUTO: 12 K/UL (ref 4.8–10.8)
WBC #/AREA URNS HPF: ABNORMAL /HPF

## 2023-08-09 PROCEDURE — 99285 EMERGENCY DEPT VISIT HI MDM: CPT

## 2023-08-09 PROCEDURE — 87086 URINE CULTURE/COLONY COUNT: CPT

## 2023-08-09 PROCEDURE — 87186 SC STD MICRODIL/AGAR DIL: CPT

## 2023-08-09 PROCEDURE — 85025 COMPLETE CBC W/AUTO DIFF WBC: CPT

## 2023-08-09 PROCEDURE — 72125 CT NECK SPINE W/O DYE: CPT

## 2023-08-09 PROCEDURE — 80053 COMPREHEN METABOLIC PANEL: CPT

## 2023-08-09 PROCEDURE — 73110 X-RAY EXAM OF WRIST: CPT | Mod: RT

## 2023-08-09 PROCEDURE — 72170 X-RAY EXAM OF PELVIS: CPT

## 2023-08-09 PROCEDURE — 73130 X-RAY EXAM OF HAND: CPT | Mod: RT

## 2023-08-09 PROCEDURE — 81001 URINALYSIS AUTO W/SCOPE: CPT

## 2023-08-09 PROCEDURE — 36415 COLL VENOUS BLD VENIPUNCTURE: CPT

## 2023-08-09 PROCEDURE — 93005 ELECTROCARDIOGRAM TRACING: CPT | Performed by: STUDENT IN AN ORGANIZED HEALTH CARE EDUCATION/TRAINING PROGRAM

## 2023-08-09 PROCEDURE — 700102 HCHG RX REV CODE 250 W/ 637 OVERRIDE(OP): Performed by: STUDENT IN AN ORGANIZED HEALTH CARE EDUCATION/TRAINING PROGRAM

## 2023-08-09 PROCEDURE — 70450 CT HEAD/BRAIN W/O DYE: CPT

## 2023-08-09 PROCEDURE — A9270 NON-COVERED ITEM OR SERVICE: HCPCS | Performed by: STUDENT IN AN ORGANIZED HEALTH CARE EDUCATION/TRAINING PROGRAM

## 2023-08-09 PROCEDURE — 87077 CULTURE AEROBIC IDENTIFY: CPT

## 2023-08-09 PROCEDURE — 70486 CT MAXILLOFACIAL W/O DYE: CPT

## 2023-08-09 RX ORDER — AMOXICILLIN AND CLAVULANATE POTASSIUM 500; 125 MG/1; MG/1
1 TABLET, FILM COATED ORAL ONCE
Status: COMPLETED | OUTPATIENT
Start: 2023-08-09 | End: 2023-08-09

## 2023-08-09 RX ORDER — AMOXICILLIN AND CLAVULANATE POTASSIUM 500; 125 MG/1; MG/1
1 TABLET, FILM COATED ORAL 2 TIMES DAILY
Qty: 14 TABLET | Refills: 0 | Status: ACTIVE | OUTPATIENT
Start: 2023-08-09 | End: 2023-08-16

## 2023-08-09 RX ADMIN — AMOXICILLIN AND CLAVULANATE POTASSIUM 1 TABLET: 500; 125 TABLET, FILM COATED ORAL at 23:26

## 2023-08-09 ASSESSMENT — FIBROSIS 4 INDEX: FIB4 SCORE: 1.36

## 2023-08-09 NOTE — LETTER
8/17/2023               Tracie Arauz  [address]  [address]  [place] [address]        Dear Tracie (MR#[id number])    This letter is sent in regards to your recent visit to the [facility] Emergency Department on 8/9/2023. During the visit, tests were performed to assist the physician in your medical diagnosis. A review of your tests requires that we notify you of the following:    Your urine culture was POSITIVE for a bacteria called Proteus mirabilis. The antibiotic prescribed for you (amoxicillin/clavulanate) should be active to treat this bacteria. It is important that you continue taking your antibiotic until it is finished.     Please feel free to contact me at the number below if you have any questions or concerns. Thank you for your cooperation in the matter.    Sincerely,  ED Culture Follow-Up Staff  Nathaniel Villasenor, PharmD    [facility], Emergency Department  [address] [place] [id number] [phone] (ED Culture Line)

## 2023-08-10 VITALS
HEIGHT: 63 IN | HEART RATE: 71 BPM | TEMPERATURE: 98.4 F | SYSTOLIC BLOOD PRESSURE: 173 MMHG | BODY MASS INDEX: 20.38 KG/M2 | RESPIRATION RATE: 18 BRPM | OXYGEN SATURATION: 95 % | WEIGHT: 115 LBS | DIASTOLIC BLOOD PRESSURE: 73 MMHG

## 2023-08-10 LAB — EKG IMPRESSION: NORMAL

## 2023-08-10 NOTE — ED NOTES
PIV removed, Pt's brief changed and reg-care provided.  Fall precautions in place with bed alarm in place for patient safety. Gurney in lowest position and call light within reach.

## 2023-08-10 NOTE — DISCHARGE PLANNING
Medical Social Work     CAROL received a text from the RN requesting CAROL assistance with [facility] transport back [facility] [address]. CAROL called [facility] and spoke with Eduarda and advised her we are sending the pt back, she stated she will be waiting for the pt.     CAROL faxed a PCS form to [facility] and set up transport with Aileen for 0200. RN aware of transport time.

## 2023-08-10 NOTE — ED NOTES
Patient resting comfortably in bed with steady and unlabored breathing on room air.  Fall precautions in place with bed alarm in place for patient safety. Gurney in lowest position and call light within reach.

## 2023-08-10 NOTE — ED NOTES
Report given to ANA. Pt transported back to SNF via ambulance transportation. Discharged instructions, POLST, and belongings transported with patient.

## 2023-08-10 NOTE — ED NOTES
Rounded on patient, patient resting comfortably in bed with steady and unlabored breathing on room air.  Fall precautions in place with bed alarm in place for patient safety. Gurney in lowest position and call light within reach.

## 2023-08-10 NOTE — ED NOTES
Bedside report received from DARIN García. Pt returned from CT, Pt resting comfortably in bed with steady and unlabored breathing. Family at bedside and call light in reach. Fall risk precautions in place and bed alarm enforced for patient safety.

## 2023-08-10 NOTE — DISCHARGE INSTRUCTIONS
Tracie was seen in the ER after a fall. There is no evidence of trauma to her brain, neck, face, right wrist, right hand or pelvis. She is walking without difficulty with a walker. She was found to have a urinary tract infection therefore through discussion with pharmacy, she has been started on Augmentin. Please give her this twice a day for the next week. Return to the ER for fever, vomiting, worsening pain or any other concerns.

## 2023-08-10 NOTE — ED TRIAGE NOTES
"Chief Complaint   Patient presents with    GLF     MGLF, tripped over chair while walking without walker, R wrist pain, -head injury, +aspirin, hx multiple falls, dementia aox3 baseline        BIB [facility] for above complaint. DNR. Hx a fib, HTN, rhabdo. EMS vitals 164/92 HR 94 97% RA gcs 14    IV places, labs drawn.      BP (!) 182/79   Pulse 80   Temp 36.9 °C (98.5 °F) (Temporal)   Resp 16   Ht 1.6 m (5' 3\")   Wt 52.2 kg (115 lb)   SpO2 95%   BMI 20.37 kg/m²      "

## 2023-08-10 NOTE — ED PROVIDER NOTES
ED Provider Note    CHIEF COMPLAINT  Chief Complaint   Patient presents with    GLF     MGLF, tripped over chair while walking without walker, R wrist pain, -head injury, +aspirin, hx multiple falls, dementia aox3 baseline       EXTERNAL RECORDS REVIEWED  Outpatient Notes patient seen by her geriatric nurse practitioner for acquired hypothyroidism, coronary artery disease, hypertension, atrial fibrillation, mild cognitive impairment on 2023    HPI/ROS  LIMITATION TO HISTORY   Select: : None  OUTSIDE HISTORIAN(S):  Family youngest daughter    Tracie Arauz is a 89 y.o. female with history of atrial fibrillation on aspirin who presents after mechanical ground-level fall.  The patient lives at an assisted living facility as she has a history of dementia.  She needs a walker to walk but she frequently forgets to use it.  Today, she tripped while walking without her walker.  She denies hitting her head however she does have an abrasion to the right forehead.  She did suffer a fall a few weeks ago and has some bruising on the left side of her face and left arm from this fall.  She denies losing consciousness.  She states that that the only pain is in her right wrist.  She has no chest pain, shortness of breath, abdominal pain, nausea, vomiting, numbness, tingling, weakness.    PAST MEDICAL HISTORY   has a past medical history of Acute conjunctivitis of both eyes (2023) and Acute cystitis without hematuria (2022).    SURGICAL HISTORY   has a past surgical history that includes hysterectomy, total abdominal.    FAMILY HISTORY  History reviewed. No pertinent family history.    SOCIAL HISTORY  Social History     Tobacco Use    Smoking status: Former     Packs/day: 0.50     Types: Cigarettes     Quit date: 1985     Years since quittin.7    Smokeless tobacco: Never   Vaping Use    Vaping Use: Never used   Substance and Sexual Activity    Alcohol use: No    Drug use: Never    Sexual activity: Never  "      CURRENT MEDICATIONS  Home Medications       Reviewed by Raquel Perez R.N. (Registered Nurse) on 08/09/23 at 1807  Med List Status: Partial     Medication Last Dose Status   alendronate (FOSAMAX) 70 MG Tab  Active   aspirin EC (ECOTRIN) 81 MG Tablet Delayed Response  Active   CRANBERRY-VITAMIN C-D MANNOSE PO  Active   erythromycin 5 MG/GM Ointment  Active   gabapentin (NEURONTIN) 300 MG Cap  Active   latanoprost (XALATAN) 0.005 % Solution  Active   levothyroxine (SYNTHROID) 112 MCG Tab  Active   liothyronine (CYTOMEL) 5 MCG Tab  Active   lisinopril (PRINIVIL) 10 MG Tab  Active   metoprolol tartrate (LOPRESSOR) 50 MG Tab  Active   mirtazapine (REMERON) 7.5 MG tablet  Active   Multiple Vitamins-Minerals (QC MULTI-BRITTANY) Tab  Active   polyethyl glycol-propyl glycol (LUBRICATING EYE DROPS) 0.4-0.3 % Solution  Active   Probiotic, Lactobacillus, Cap  Active   sennosides-docusate sodium (SENOKOT-S) 8.6-50 MG tablet  Active                    ALLERGIES  Allergies   Allergen Reactions    Duricef [Cefadroxil]      \"Rash\"       PHYSICAL EXAM  VITAL SIGNS: BP (!) 182/79   Pulse 80   Temp 36.9 °C (98.5 °F) (Temporal)   Resp 16   Ht 1.6 m (5' 3\")   Wt 52.2 kg (115 lb)   SpO2 95%   BMI 20.37 kg/m²      Constitutional: Well developed, Well nourished, elderly  female, no acute distress  HEENT: Normocephalic,  external ears normal, pharynx pink,  Mucous  Membranes moist, No rhinorrhea or mucosal edema.  Old appearing ecchymosis to left side of face, small abrasion to right forehead.  No midface instability, no nasal septal hematoma, no intraoral trauma  Eyes: PERRL, EOMI, Conjunctiva normal, No discharge.   Neck: Normal range of motion, No tenderness, Supple, No stridor.   Cardiovascular: Regular Rate and Rhythm, No murmurs,  rubs, or gallops.   Thorax & Lungs: Lungs clear to auscultation bilaterally, No respiratory distress, No wheezes, rhales or rhonchi, No chest wall tenderness.   Abdomen: Bowel sounds " normal, Soft, non tender, non distended,  No pulsatile masses., no rebound guarding or peritoneal signs.   Skin: Warm, Dry, No erythema, No rash, old appearing ecchymosis to the left proximal humerus area.  Right wrist with area of ecchymosis to dorsal aspect of right wrist.  Old appearing ecchymosis to left and right shins  Back:  No CVA tenderness,  No spinal tenderness, bony crepitance step offs or instability.   Extremities: Equal, intact distal pulses, No cyanosis, clubbing or edema, tenderness to right distal radius, no snuffbox tenderness to palpation, no deformity noted.  Otherwise left upper extremity is nontender, bilateral lower extremities are nontender  Musculoskeletal: Good range of motion in all major joints. No tenderness to palpation or major deformities noted.   Neurologic: Alert & oriented x 3, Cranial nerves II-XII intact, Equal strength and sensation upper and lower extremities bilaterally,  No focal deficits noted.   Psychiatric: Affect normal, Judgment normal, Mood normal. No suicidal or homicidal ideation      DIAGNOSTIC STUDIES / PROCEDURES    LABS  Results for orders placed or performed during the hospital encounter of 08/09/23   CBC WITH DIFFERENTIAL   Result Value Ref Range    WBC 12.0 (H) 4.8 - 10.8 K/uL    RBC 3.92 (L) 4.20 - 5.40 M/uL    Hemoglobin 12.0 12.0 - 16.0 g/dL    Hematocrit 37.3 37.0 - 47.0 %    MCV 95.2 81.4 - 97.8 fL    MCH 30.6 27.0 - 33.0 pg    MCHC 32.2 32.2 - 35.5 g/dL    RDW 44.0 35.9 - 50.0 fL    Platelet Count 333 164 - 446 K/uL    MPV 9.7 9.0 - 12.9 fL    Neutrophils-Polys 84.80 (H) 44.00 - 72.00 %    Lymphocytes 8.90 (L) 22.00 - 41.00 %    Monocytes 5.30 0.00 - 13.40 %    Eosinophils 0.30 0.00 - 6.90 %    Basophils 0.20 0.00 - 1.80 %    Immature Granulocytes 0.50 0.00 - 0.90 %    Nucleated RBC 0.00 0.00 - 0.20 /100 WBC    Neutrophils (Absolute) 10.17 (H) 1.82 - 7.42 K/uL    Lymphs (Absolute) 1.07 1.00 - 4.80 K/uL    Monos (Absolute) 0.63 0.00 - 0.85 K/uL    Eos  (Absolute) 0.04 0.00 - 0.51 K/uL    Baso (Absolute) 0.02 0.00 - 0.12 K/uL    Immature Granulocytes (abs) 0.06 0.00 - 0.11 K/uL    NRBC (Absolute) 0.00 K/uL   COMP METABOLIC PANEL   Result Value Ref Range    Sodium 138 135 - 145 mmol/L    Potassium 4.3 3.6 - 5.5 mmol/L    Chloride 104 96 - 112 mmol/L    Co2 23 20 - 33 mmol/L    Anion Gap 11.0 7.0 - 16.0    Glucose 118 (H) 65 - 99 mg/dL    Bun 32 (H) 8 - 22 mg/dL    Creatinine 1.11 0.50 - 1.40 mg/dL    Calcium 9.2 8.5 - 10.5 mg/dL    Correct Calcium 9.2 8.5 - 10.5 mg/dL    AST(SGOT) 17 12 - 45 U/L    ALT(SGPT) 14 2 - 50 U/L    Alkaline Phosphatase 101 (H) 30 - 99 U/L    Total Bilirubin 0.4 0.1 - 1.5 mg/dL    Albumin 4.0 3.2 - 4.9 g/dL    Total Protein 7.0 6.0 - 8.2 g/dL    Globulin 3.0 1.9 - 3.5 g/dL    A-G Ratio 1.3 g/dL   URINALYSIS CULTURE, IF INDICATED    Specimen: Urine, Cath   Result Value Ref Range    Color Yellow     Character Cloudy (A)     Specific Gravity 1.018 <1.035    Ph 7.0 5.0 - 8.0    Glucose Negative Negative mg/dL    Ketones Trace (A) Negative mg/dL    Protein 100 (A) Negative mg/dL    Bilirubin Negative Negative    Urobilinogen, Urine 0.2 Negative    Nitrite Negative Negative    Leukocyte Esterase Large (A) Negative    Occult Blood Moderate (A) Negative    Micro Urine Req Microscopic    ESTIMATED GFR   Result Value Ref Range    GFR (CKD-EPI) 47 (A) >60 mL/min/1.73 m 2   URINE MICROSCOPIC (W/UA)   Result Value Ref Range    WBC Packed (A) /hpf    RBC 10-20 (A) /hpf    Bacteria Moderate (A) None /hpf    Epithelial Cells Negative /hpf    Hyaline Cast 0-2 /lpf   URINE CULTURE(NEW)    Specimen: Urine   Result Value Ref Range    Significant Indicator NEG     Source UR     Site -     Culture Result -    EKG (NOW)   Result Value Ref Range    Report       [facility] Emergency Dept.    Test Date:  2023-08-09  Pt Name:    EDIL ZIMMER           Department: ER  MRN:        [id number]                      Room:        09  Gender:      Female                       Technician: [id number]  :        1931-07-10                   Requested By:ALEXANDER MACK  Order #:    [id number]                    Reading MD:    Measurements  Intervals                                Axis  Rate:       71                           P:          68  MO:         179                          QRS:        -49  QRSD:       79                           T:          56  QT:         427  QTc:        465    Interpretive Statements  Sinus rhythm  Left anterior fascicular block  Consider left ventricular hypertrophy  Anterior Q waves, possibly due to LVH  ST elevation, consider inferior injury  Artifact in lead(s) I,II,III,aVR,aVL,aVF,V1,V2,V3,V4,V5,V6 and baseline  wander in lead(s) II  No previous ECG available for comparison     I have independently interpreted this EKG      RADIOLOGY  I have independently interpreted the diagnostic imaging associated with this visit and am waiting the final reading from the radiologist.   My preliminary interpretation is as follows: no ICH  Radiologist interpretation:   DX-PELVIS-1 OR 2 VIEWS   Final Result      1.  Osteopenia.      2.  No evidence of pelvic fracture and/or dislocation.      CT-CSPINE WITHOUT PLUS RECONS   Final Result      No acute abnormality identified.      CT-MAXILLOFACIAL W/O PLUS RECONS   Final Result      Negative maxillofacial/paranasal sinuses CT scan without contrast.      CT-HEAD W/O   Final Result      1. Cerebral atrophy.   2. White matter lucencies most consistent with small vessel ischemic change versus demyelination or gliosis.   3. Otherwise, Head CT without contrast with no acute findings. No evidence of acute cerebral infarction, hemorrhage or mass lesion.         DX-HAND 3+ RIGHT   Final Result      No radiographic evidence of acute traumatic injury.      DX-WRIST-COMPLETE 3+ RIGHT   Final Result      No radiographic evidence of acute traumatic injury.            COURSE & MEDICAL DECISION MAKING    ED  Observation Status? Yes; I am placing the patient in to an observation status due to a diagnostic uncertainty as well as therapeutic intensity. Patient placed in observation status at 6:12 AM, 8/09/2023.     Observation plan is as follows: labs, imaging, discussion with care facility, reassessment    8:28 PM patient reevaluated at bedside.  She is resting comfortably and would like to sleep.  Her youngest daughter is at bedside.  I provided her with an update.  At this point there is no evidence of acute traumatic injury on her imaging.  We are awaiting urine results.  Patient daughter feels comfortable if patient is discharged back to her facility.  She is going to leave as patient would like to sleep.    9:10 PM I received a call from nursing staff that the patient's assisted living facility had reached out to the emergency room requesting that we do an x-ray of her pelvis that they want to rule out any hip fracture.  The patient is not having any hip pain.  We will get a x-ray pelvis and then do an ambulation trial to see if we need to do any further imaging of her hips.    10:46 PM pelvic x-ray with no evidence of fracture.  Patient was able to ambulate with walker around emergency room with steady gait and had no complaint of hip pain.  Given negative x-ray and ability to ambulate without pain, I suspect this is unlikely to represent hip fracture.  I called the patient's nurse at assisted living facility and relayed this to her.  They are happy to welcome the patient back.  I did discuss that she was found to have a urinary tract infection on workup.  She requested that I send a prescription to [facility] which was done.  Patient will be given first dose of antibiotics here in the emergency room.  Will plan for Carlos a discharge home.    Upon Reevaluation, the patient's condition has: Improved; and will be discharged.    Patient discharged from ED Observation status at 000 (Time) 08/10/2023 (Date).     INITIAL  ASSESSMENT, COURSE AND PLAN  Care Narrative: This is a 89-year-old female who is on aspirin for treatment of atrial fibrillation who presents after mechanical ground-level fall that occurred at her assisted living facility.  The patient is supposed to use a walker to walk but frequently forgets to use her walker and tripped and fall.  She does have old bruising on her left side of face as well as left upper arm and bilateral legs which do show evidence of old falls.  She did not lose consciousness today.  She has no chest pain or shortness of breath.  She is nontoxic on appearance.  Her vital signs are stable without tachycardia or hypotension.  She is afebrile.    CT head and C-spine as well as maxillofacial images were obtained.  There are no acute fractures seen or intracranial hemorrhage or subluxation of the C-spine.  Right wrist x-ray was obtained and there is no evidence of acute fracture to the wrist or hand.  She has no snuffbox tenderness to raise concern for scaphoid fracture.  Labs were obtained and there is mild leukocytosis to 12,000 however patient has no other SIRS criteria such as tachycardia, tachypnea or hypotension therefore I doubt sepsis.  Electrolytes were within normal limits.  Renal function is normal.  UA was obtained and she has evidence of urinary tract infection with packed white blood cells, large leukocyte Estrase.  Patient does have a history of cephalosporin allergy therefore I did discuss with pharmacy regarding treatment options for her urinary tract infection.  They suggested Augmentin.  Patient has no systemic signs of illness from this urinary tract infection.  She was given first dose of Augmentin in the emergency room    As noted above, we did receive call from her care facility as they wanted us to obtain pelvic x-ray to look for hip fracture.  This was done and shows no acute fracture.  Patient is ambulatory and has no complaints of hip pain therefore I doubt hip fracture.   She otherwise has no back pain.  She is tolerating p.o.    I did relay the patient's plan of care with the patient's nurse at her assisted living facility.  Order for ambulance transfer back to her assisted living facility.  Recommend primary care follow-up in 2 days.  Strict ER return precautions were discussed.  Patient agreeable to discharge plan no further questions.    HTN/IDDM FOLLOW UP:  The patient is referred to a primary physician for blood pressure management, diabetic screening, and for all other preventive health concerns        DISPOSITION AND DISCUSSIONS  Discussion of management with other Q or appropriate source(s): Pharmacy regarding antibiotic selection for urinary tract infection given her history of cephalosporin allergy. Pharmacy recommends Augmentin      Patient discharged home in stable condition with prescription for Augmentin.  Strict ER return precautions were discussed.  Patient is agreeable to plan and no further questions.      FINAL DIAGNOSIS  1. Fall, initial encounter    2. Right wrist pain    3. Acute cystitis with hematuria         Electronically signed by: Ludy Snyder M.D., 8/9/2023 6:12 PM

## 2023-08-10 NOTE — ED NOTES
Performed road test with patient: Pt ambulated using walker with steady gait. RN was on standby, Pt denied pain, dizziness, nor did she appear unstable during ambulation. MD notified.

## 2023-08-12 LAB
BACTERIA UR CULT: ABNORMAL
BACTERIA UR CULT: ABNORMAL
SIGNIFICANT IND 70042: ABNORMAL
SITE SITE: ABNORMAL
SOURCE SOURCE: ABNORMAL

## 2023-08-18 NOTE — ED NOTES
ED Positive Culture Follow-up/Notification Note:    Date: 8/17/2023   Patient seen in the ED on 8/9/2023 for GLF. Patient denies losing consciousness and denies striking her head. Patient denies chest/abdominal pain, N/V, numbness/weakness. Patient reports only pain in R wrist.   Physical exam is notable for no CVA/spinal tenderness. In the ED patient was afebrile with stable vitals. WBC count 12. Imaging revealed no traumatic injury. Patient received a single dose of Augmentin in the ED.   1. Fall, initial encounter    2. Right wrist pain    3. Acute cystitis with hematuria       Discharge Medication List as of 8/10/2023  1:39 AM        START taking these medications    Details   amoxicillin-clavulanate (AUGMENTIN) 500-125 MG Tab Take 1 Tablet by mouth 2 times a day for 7 days., Disp-14 Tablet, R-0, Normal           Allergies: Duricef [cefadroxil]     Vitals:    08/10/23 0202 08/10/23 0220 08/10/23 0230 08/10/23 0240   BP: (!) 173/73      Pulse: 72 74 68 71   Resp:       Temp:       TempSrc:       SpO2: 96% 93% 94% 95%   Weight:       Height:         Final cultures:   Results       Procedure Component Value Units Date/Time    URINE CULTURE(NEW) [[id number]]  (Abnormal)  (Susceptibility) Collected: 08/09/23 2010    Order Status: Completed Specimen: Urine Updated: 08/12/23 1205     Significant Indicator POS     Source UR     Site -     Culture Result -      Proteus mirabilis  >100,000 cfu/mL      Narrative:      Indication for culture:->Patient WITHOUT an indwelling Vargas  catheter in place with new onset of Dysuria, Frequency,  Urgency, and/or Suprapubic pain    Susceptibility       Proteus mirabilis (1)       Antibiotic Interpretation Microscan   Method Status    Amikacin  [*]  Sensitive <=16 mcg/mL [place] Final    Ampicillin Sensitive <=8 mcg/mL [facility] Final    Amoxicillin/CA  [*]  Sensitive <=8/4 mcg/mL [facility] Final    Aztreonam  [*]  Sensitive <=4 mcg/mL [facility] Final    Ceftolozane+Tazobactam  [*]  Sensitive <=2 mcg/mL [facility]  Final    Ceftriaxone Sensitive <=1 mcg/mL [facility] Final    Ceftazidime  [*]  Sensitive <=1 mcg/mL [facility] Final    Cefazolin Sensitive 4 mcg/mL [facility] Final     Breakpoints when Cefazolin is used for therapy of infections  other than uncomplicated UTIs due to Enterobacterales are as  follows:  VAL[place]INA and Interpretation:  <=2 S  4 I  >=8 R         Ciprofloxacin Resistant >2 mcg/mL [facility] Final    Cefepime Sensitive <=2 mcg/mL [facility] Final    Ampicillin/sulbactam Sensitive <=4/2 mcg/mL [facility] Final    Cefuroxime Sensitive <=4 mcg/mL [facility] Final    Tobramycin Sensitive <=2 mcg/mL [facility] Final    Ertapenem  [*]  Sensitive <=0.5 mcg/mL [facility] Final    Nitrofurantoin Resistant >64 mcg/mL [facility] Final    Gentamicin Sensitive <=2 mcg/mL [facility] Final    Levofloxacin Resistant 4 mcg/mL [facility] Final    Meropenem  [*]  Sensitive <=1 mcg/mL [facility] Final    Meropenem/Vaborbactam  [*]  Sensitive <=2 mcg/mL [facility] Final    Minocycline Resistant >8 mcg/mL [facility] Final    Pip/Tazobactam Sensitive <=8 mcg/mL [facility] Final    Trimeth/Sulfa Resistant >2/38 mcg/mL [facility] Final    Tetracycline  [*]  Resistant >8 mcg/mL [place] Final               [*]  Suppressed Antibiotic                         Plan:   Patient's urine culture resulted showing >100k Proteus mirabilis sensitive to prescribed Augmentin. Augmentin dosed appropriately for patient's renal function.   Appropriate antibiotic therapy prescribed. No changes required based upon culture result.  Sent [employer] message to patient to notify of positive culture result and encourage compliance with prescribed antibiotics.   Nathaniel Villasenor, PharmD

## 2023-08-24 ENCOUNTER — APPOINTMENT (OUTPATIENT)
Dept: RADIOLOGY | Facility: MEDICAL CENTER | Age: 88
DRG: 086 | End: 2023-08-24
Attending: STUDENT IN AN ORGANIZED HEALTH CARE EDUCATION/TRAINING PROGRAM
Payer: MEDICARE

## 2023-08-24 ENCOUNTER — HOSPITAL ENCOUNTER (INPATIENT)
Facility: MEDICAL CENTER | Age: 88
LOS: 6 days | DRG: 086 | End: 2023-08-30
Attending: STUDENT IN AN ORGANIZED HEALTH CARE EDUCATION/TRAINING PROGRAM | Admitting: SURGERY
Payer: MEDICARE

## 2023-08-24 DIAGNOSIS — S06.5XAA SDH (SUBDURAL HEMATOMA) (HCC): ICD-10-CM

## 2023-08-24 DIAGNOSIS — R54 FRAILTY SYNDROME IN GERIATRIC PATIENT: ICD-10-CM

## 2023-08-24 DIAGNOSIS — S06.5X0A POST-TRAUMATIC SUBDURAL HEMATOMA, WITHOUT LOSS OF CONSCIOUSNESS, INITIAL ENCOUNTER (HCC): ICD-10-CM

## 2023-08-24 PROBLEM — Z79.02 ANTIPLATELET OR ANTITHROMBOTIC LONG-TERM USE: Status: ACTIVE | Noted: 2023-08-24

## 2023-08-24 PROBLEM — N18.30 CKD (CHRONIC KIDNEY DISEASE) STAGE 3, GFR 30-59 ML/MIN: Status: ACTIVE | Noted: 2023-08-24

## 2023-08-24 PROBLEM — S41.102A: Status: ACTIVE | Noted: 2023-08-24

## 2023-08-24 PROBLEM — H91.90 HEARING LOSS: Status: ACTIVE | Noted: 2023-08-24

## 2023-08-24 PROBLEM — S01.81XA FACIAL LACERATION: Status: ACTIVE | Noted: 2023-08-24

## 2023-08-24 PROBLEM — R29.6 FREQUENT FALLS: Status: ACTIVE | Noted: 2023-08-24

## 2023-08-24 PROBLEM — F32.A DEPRESSION: Status: ACTIVE | Noted: 2022-09-05

## 2023-08-24 PROBLEM — R32 URINARY INCONTINENCE: Status: ACTIVE | Noted: 2023-08-24

## 2023-08-24 PROBLEM — Z01.89 ENCOUNTER FOR GERIATRIC ASSESSMENT: Status: ACTIVE | Noted: 2023-08-24

## 2023-08-24 PROBLEM — R41.89 COGNITIVE IMPAIRMENT: Status: ACTIVE | Noted: 2022-07-05

## 2023-08-24 PROBLEM — T14.90XA TRAUMA: Status: ACTIVE | Noted: 2023-08-24

## 2023-08-24 PROBLEM — Z53.09 CONTRAINDICATION TO DEEP VEIN THROMBOSIS (DVT) PROPHYLAXIS: Status: ACTIVE | Noted: 2023-08-24

## 2023-08-24 PROBLEM — Z91.89 AT RISK FOR DELIRIUM: Status: ACTIVE | Noted: 2023-08-24

## 2023-08-24 LAB
ALBUMIN SERPL BCP-MCNC: 3.2 G/DL (ref 3.2–4.9)
ALBUMIN/GLOB SERPL: 1.4 G/DL
ALP SERPL-CCNC: 77 U/L (ref 30–99)
ALT SERPL-CCNC: 11 U/L (ref 2–50)
ANION GAP SERPL CALC-SCNC: 8 MMOL/L (ref 7–16)
AST SERPL-CCNC: 17 U/L (ref 12–45)
BASOPHILS # BLD AUTO: 0.3 % (ref 0–1.8)
BASOPHILS # BLD: 0.03 K/UL (ref 0–0.12)
BILIRUB SERPL-MCNC: 0.4 MG/DL (ref 0.1–1.5)
BUN SERPL-MCNC: 25 MG/DL (ref 8–22)
CALCIUM ALBUM COR SERPL-MCNC: 8.9 MG/DL (ref 8.5–10.5)
CALCIUM SERPL-MCNC: 8.3 MG/DL (ref 8.5–10.5)
CFT BLD TEG: 3.6 MIN (ref 4.6–9.1)
CFT P HPASE BLD TEG: 4 MIN (ref 4.3–8.3)
CHLORIDE SERPL-SCNC: 109 MMOL/L (ref 96–112)
CLOT ANGLE BLD TEG: 77.8 DEGREES (ref 63–78)
CO2 SERPL-SCNC: 25 MMOL/L (ref 20–33)
CREAT SERPL-MCNC: 1.19 MG/DL (ref 0.5–1.4)
CT.EXTRINSIC BLD ROTEM: 0.8 MIN (ref 0.8–2.1)
EOSINOPHIL # BLD AUTO: 0.03 K/UL (ref 0–0.51)
EOSINOPHIL NFR BLD: 0.3 % (ref 0–6.9)
ERYTHROCYTE [DISTWIDTH] IN BLOOD BY AUTOMATED COUNT: 43.8 FL (ref 35.9–50)
GFR SERPLBLD CREATININE-BSD FMLA CKD-EPI: 43 ML/MIN/1.73 M 2
GLOBULIN SER CALC-MCNC: 2.3 G/DL (ref 1.9–3.5)
GLUCOSE SERPL-MCNC: 110 MG/DL (ref 65–99)
HCT VFR BLD AUTO: 31.5 % (ref 37–47)
HGB BLD-MCNC: 10.4 G/DL (ref 12–16)
IMM GRANULOCYTES # BLD AUTO: 0.03 K/UL (ref 0–0.11)
IMM GRANULOCYTES NFR BLD AUTO: 0.3 % (ref 0–0.9)
LYMPHOCYTES # BLD AUTO: 2.58 K/UL (ref 1–4.8)
LYMPHOCYTES NFR BLD: 24 % (ref 22–41)
MCF BLD TEG: 63.5 MM (ref 52–69)
MCF.PLATELET INHIB BLD ROTEM: 21.7 MM (ref 15–32)
MCH RBC QN AUTO: 30.8 PG (ref 27–33)
MCHC RBC AUTO-ENTMCNC: 33 G/DL (ref 32.2–35.5)
MCV RBC AUTO: 93.2 FL (ref 81.4–97.8)
MONOCYTES # BLD AUTO: 0.64 K/UL (ref 0–0.85)
MONOCYTES NFR BLD AUTO: 5.9 % (ref 0–13.4)
NEUTROPHILS # BLD AUTO: 7.45 K/UL (ref 1.82–7.42)
NEUTROPHILS NFR BLD: 69.2 % (ref 44–72)
NRBC # BLD AUTO: 0 K/UL
NRBC BLD-RTO: 0 /100 WBC (ref 0–0.2)
PA AA BLD-ACNC: 37.9 % (ref 0–11)
PA ADP BLD-ACNC: 5 % (ref 0–17)
PLATELET # BLD AUTO: 294 K/UL (ref 164–446)
PMV BLD AUTO: 9.9 FL (ref 9–12.9)
POTASSIUM SERPL-SCNC: 4 MMOL/L (ref 3.6–5.5)
PROT SERPL-MCNC: 5.5 G/DL (ref 6–8.2)
RBC # BLD AUTO: 3.38 M/UL (ref 4.2–5.4)
SODIUM SERPL-SCNC: 142 MMOL/L (ref 135–145)
TEG ALGORITHM TGALG: ABNORMAL
WBC # BLD AUTO: 10.8 K/UL (ref 4.8–10.8)

## 2023-08-24 PROCEDURE — 85576 BLOOD PLATELET AGGREGATION: CPT | Mod: 91

## 2023-08-24 PROCEDURE — 304217 HCHG IRRIGATION SYSTEM

## 2023-08-24 PROCEDURE — 99285 EMERGENCY DEPT VISIT HI MDM: CPT

## 2023-08-24 PROCEDURE — 303747 HCHG EXTRA SUTURE

## 2023-08-24 PROCEDURE — 90715 TDAP VACCINE 7 YRS/> IM: CPT | Performed by: STUDENT IN AN ORGANIZED HEALTH CARE EDUCATION/TRAINING PROGRAM

## 2023-08-24 PROCEDURE — 85025 COMPLETE CBC W/AUTO DIFF WBC: CPT

## 2023-08-24 PROCEDURE — 99223 1ST HOSP IP/OBS HIGH 75: CPT | Mod: AI | Performed by: SURGERY

## 2023-08-24 PROCEDURE — 304999 HCHG REPAIR-SIMPLE/INTERMED LEVEL 1

## 2023-08-24 PROCEDURE — 85384 FIBRINOGEN ACTIVITY: CPT

## 2023-08-24 PROCEDURE — 85347 COAGULATION TIME ACTIVATED: CPT

## 2023-08-24 PROCEDURE — 70450 CT HEAD/BRAIN W/O DYE: CPT

## 2023-08-24 PROCEDURE — 51798 US URINE CAPACITY MEASURE: CPT

## 2023-08-24 PROCEDURE — 700111 HCHG RX REV CODE 636 W/ 250 OVERRIDE (IP): Mod: JZ

## 2023-08-24 PROCEDURE — 0HQ1XZZ REPAIR FACE SKIN, EXTERNAL APPROACH: ICD-10-PCS | Performed by: STUDENT IN AN ORGANIZED HEALTH CARE EDUCATION/TRAINING PROGRAM

## 2023-08-24 PROCEDURE — 80053 COMPREHEN METABOLIC PANEL: CPT

## 2023-08-24 PROCEDURE — 90471 IMMUNIZATION ADMIN: CPT

## 2023-08-24 PROCEDURE — 73030 X-RAY EXAM OF SHOULDER: CPT | Mod: RT

## 2023-08-24 PROCEDURE — 36415 COLL VENOUS BLD VENIPUNCTURE: CPT

## 2023-08-24 PROCEDURE — 700111 HCHG RX REV CODE 636 W/ 250 OVERRIDE (IP): Performed by: STUDENT IN AN ORGANIZED HEALTH CARE EDUCATION/TRAINING PROGRAM

## 2023-08-24 PROCEDURE — 73130 X-RAY EXAM OF HAND: CPT | Mod: LT

## 2023-08-24 PROCEDURE — 99221 1ST HOSP IP/OBS SF/LOW 40: CPT | Mod: AI | Performed by: FAMILY MEDICINE

## 2023-08-24 PROCEDURE — 770001 HCHG ROOM/CARE - MED/SURG/GYN PRIV*

## 2023-08-24 PROCEDURE — 73090 X-RAY EXAM OF FOREARM: CPT | Mod: LT

## 2023-08-24 RX ORDER — CIPROFLOXACIN 250 MG/1
250 TABLET, FILM COATED ORAL 2 TIMES DAILY
Status: ON HOLD | COMMUNITY
End: 2023-08-30

## 2023-08-24 RX ORDER — POLYETHYLENE GLYCOL 3350 17 G/17G
1 POWDER, FOR SOLUTION ORAL 2 TIMES DAILY
Status: DISCONTINUED | OUTPATIENT
Start: 2023-08-24 | End: 2023-08-30 | Stop reason: HOSPADM

## 2023-08-24 RX ORDER — METOPROLOL TARTRATE 50 MG/1
50 TABLET, FILM COATED ORAL DAILY
Status: ON HOLD | COMMUNITY
End: 2023-08-30

## 2023-08-24 RX ORDER — HYDRALAZINE HYDROCHLORIDE 20 MG/ML
10 INJECTION INTRAMUSCULAR; INTRAVENOUS EVERY 4 HOURS PRN
Status: DISCONTINUED | OUTPATIENT
Start: 2023-08-24 | End: 2023-08-30 | Stop reason: HOSPADM

## 2023-08-24 RX ORDER — MIRTAZAPINE 15 MG/1
7.5 TABLET, FILM COATED ORAL NIGHTLY
Status: DISCONTINUED | OUTPATIENT
Start: 2023-08-24 | End: 2023-08-30 | Stop reason: HOSPADM

## 2023-08-24 RX ORDER — AMOXICILLIN 250 MG
1 CAPSULE ORAL NIGHTLY
Status: DISCONTINUED | OUTPATIENT
Start: 2023-08-24 | End: 2023-08-30 | Stop reason: HOSPADM

## 2023-08-24 RX ORDER — BISACODYL 10 MG
10 SUPPOSITORY, RECTAL RECTAL
Status: DISCONTINUED | OUTPATIENT
Start: 2023-08-24 | End: 2023-08-30 | Stop reason: HOSPADM

## 2023-08-24 RX ORDER — LIOTHYRONINE SODIUM 5 UG/1
5 TABLET ORAL DAILY
Status: DISCONTINUED | OUTPATIENT
Start: 2023-08-24 | End: 2023-08-30 | Stop reason: HOSPADM

## 2023-08-24 RX ORDER — LEVOTHYROXINE SODIUM 112 UG/1
112 TABLET ORAL
Status: DISCONTINUED | OUTPATIENT
Start: 2023-08-24 | End: 2023-08-30 | Stop reason: HOSPADM

## 2023-08-24 RX ORDER — ACETAMINOPHEN 325 MG/1
650 TABLET ORAL EVERY 6 HOURS PRN
Status: DISCONTINUED | OUTPATIENT
Start: 2023-08-29 | End: 2023-08-30 | Stop reason: HOSPADM

## 2023-08-24 RX ORDER — ONDANSETRON 2 MG/ML
4 INJECTION INTRAMUSCULAR; INTRAVENOUS EVERY 4 HOURS PRN
Status: DISCONTINUED | OUTPATIENT
Start: 2023-08-24 | End: 2023-08-30 | Stop reason: HOSPADM

## 2023-08-24 RX ORDER — ACETAMINOPHEN 325 MG/1
650 TABLET ORAL EVERY 6 HOURS
Status: DISPENSED | OUTPATIENT
Start: 2023-08-24 | End: 2023-08-29

## 2023-08-24 RX ORDER — LEVETIRACETAM 500 MG/5ML
500 INJECTION, SOLUTION, CONCENTRATE INTRAVENOUS EVERY 12 HOURS
Status: DISCONTINUED | OUTPATIENT
Start: 2023-08-24 | End: 2023-08-30 | Stop reason: HOSPADM

## 2023-08-24 RX ORDER — ENEMA 19; 7 G/133ML; G/133ML
1 ENEMA RECTAL
Status: DISCONTINUED | OUTPATIENT
Start: 2023-08-24 | End: 2023-08-30 | Stop reason: HOSPADM

## 2023-08-24 RX ORDER — ONDANSETRON 4 MG/1
4 TABLET, ORALLY DISINTEGRATING ORAL EVERY 4 HOURS PRN
Status: DISCONTINUED | OUTPATIENT
Start: 2023-08-24 | End: 2023-08-30 | Stop reason: HOSPADM

## 2023-08-24 RX ORDER — IBUPROFEN 200 MG
200 TABLET ORAL EVERY 8 HOURS PRN
Status: ON HOLD | COMMUNITY
End: 2023-08-30

## 2023-08-24 RX ORDER — LISINOPRIL 10 MG/1
10 TABLET ORAL DAILY
Status: DISCONTINUED | OUTPATIENT
Start: 2023-08-24 | End: 2023-08-30 | Stop reason: HOSPADM

## 2023-08-24 RX ORDER — AMOXICILLIN 250 MG
1 CAPSULE ORAL
Status: DISCONTINUED | OUTPATIENT
Start: 2023-08-24 | End: 2023-08-30 | Stop reason: HOSPADM

## 2023-08-24 RX ORDER — DOCUSATE SODIUM 100 MG/1
100 CAPSULE, LIQUID FILLED ORAL 2 TIMES DAILY
Status: DISCONTINUED | OUTPATIENT
Start: 2023-08-24 | End: 2023-08-30 | Stop reason: HOSPADM

## 2023-08-24 RX ORDER — LEVETIRACETAM 500 MG/1
500 TABLET ORAL EVERY 12 HOURS
Status: DISCONTINUED | OUTPATIENT
Start: 2023-08-24 | End: 2023-08-30 | Stop reason: HOSPADM

## 2023-08-24 RX ORDER — SACCHAROMYCES BOULARDII 250 MG
250 CAPSULE ORAL DAILY
Status: ON HOLD | COMMUNITY
End: 2023-08-30

## 2023-08-24 RX ORDER — AMOXICILLIN AND CLAVULANATE POTASSIUM 500; 125 MG/1; MG/1
1 TABLET, FILM COATED ORAL 2 TIMES DAILY
Status: ON HOLD | COMMUNITY
Start: 2023-08-10 | End: 2023-08-30

## 2023-08-24 RX ADMIN — CLOSTRIDIUM TETANI TOXOID ANTIGEN (FORMALDEHYDE INACTIVATED), CORYNEBACTERIUM DIPHTHERIAE TOXOID ANTIGEN (FORMALDEHYDE INACTIVATED), BORDETELLA PERTUSSIS TOXOID ANTIGEN (GLUTARALDEHYDE INACTIVATED), BORDETELLA PERTUSSIS FILAMENTOUS HEMAGGLUTININ ANTIGEN (FORMALDEHYDE INACTIVATED), BORDETELLA PERTUSSIS PERTACTIN ANTIGEN, AND BORDETELLA PERTUSSIS FIMBRIAE 2/3 ANTIGEN 0.5 ML: 5; 2; 2.5; 5; 3; 5 INJECTION, SUSPENSION INTRAMUSCULAR at 08:04

## 2023-08-24 RX ADMIN — LEVETIRACETAM 500 MG: 100 INJECTION, SOLUTION, CONCENTRATE INTRAVENOUS at 17:42

## 2023-08-24 RX ADMIN — LIDOCAINE HYDROCHLORIDE 10 ML: 10 INJECTION, SOLUTION EPIDURAL; INFILTRATION; INTRACAUDAL at 07:00

## 2023-08-24 ASSESSMENT — FIBROSIS 4 INDEX: FIB4 SCORE: 1.26

## 2023-08-24 ASSESSMENT — COPD QUESTIONNAIRES
DURING THE PAST 4 WEEKS HOW MUCH DID YOU FEEL SHORT OF BREATH: NONE/LITTLE OF THE TIME
COPD SCREENING SCORE: 4
HAVE YOU SMOKED AT LEAST 100 CIGARETTES IN YOUR ENTIRE LIFE: YES
DO YOU EVER COUGH UP ANY MUCUS OR PHLEGM?: NO/ONLY WITH OCCASIONAL COLDS OR INFECTIONS

## 2023-08-24 ASSESSMENT — PAIN DESCRIPTION - PAIN TYPE: TYPE: ACUTE PAIN

## 2023-08-24 NOTE — ASSESSMENT & PLAN NOTE
2.6 mm right parietal subdural hematoma.  Non-operative management.   Interval head CT stable showing decrease size in subdural.  Post traumatic pharmacologic seizure prophylaxis for 1 week.  Speech Language Pathology cognitive evaluation.  Alicia Pepe MD. Neurosurgeon. [facility].

## 2023-08-24 NOTE — ED NOTES
Bedside report received from Kristin WEBSTER, pt on bed alarm, alarm plug in to wall monitor, no signs of distress noted at this time

## 2023-08-24 NOTE — ED PROVIDER NOTES
"ED Provider Note    CHIEF COMPLAINT  Chief Complaint   Patient presents with    Headache     Pt had multiple GLF falls, 1x GLF this evening onto R sided face. Pt takes 81mg ASA daily.       EXTERNAL RECORDS REVIEWED  Outpatient Notes hypothyroidism visit on 2023    HPI/ROS  LIMITATION TO HISTORY   Select: : None  OUTSIDE HISTORIAN(S):      Tracie Arauz is a 89 y.o. female who presents with a ground-level fall this evening at her [facility] facility.  Patient takes aspirin only as a blood thinner.  Patient has had multiple falls.  Patient has had no loss of consciousness but significant wound to her right brow.  Patient also has a skin tear on the left forearm and bruising to the left hand.  Patient denies back or neck pain.    PAST MEDICAL HISTORY   has a past medical history of Acute conjunctivitis of both eyes (2023) and Acute cystitis without hematuria (2022).    SURGICAL HISTORY   has a past surgical history that includes hysterectomy, total abdominal.    FAMILY HISTORY  No family history on file.    SOCIAL HISTORY  Social History     Tobacco Use    Smoking status: Former     Current packs/day: 0.00     Types: Cigarettes     Quit date: 1985     Years since quittin.8    Smokeless tobacco: Never   Vaping Use    Vaping Use: Never used   Substance and Sexual Activity    Alcohol use: No    Drug use: Never    Sexual activity: Never       CURRENT MEDICATIONS  Home Medications    **Home medications have not yet been reviewed for this encounter**         ALLERGIES  Allergies   Allergen Reactions    Duricef [Cefadroxil]      \"Rash\"       PHYSICAL EXAM  VITAL SIGNS: /62   Pulse 63   Temp 37 °C (98.6 °F) (Temporal)   Resp 16   Ht 1.651 m (5' 5\")   Wt 45.4 kg (100 lb)   SpO2 95%   BMI 16.64 kg/m²    Vitals and nursing note reviewed.   Constitutional:       Comments: Patient is lying in bed supine, pleasant, conversant, speaking in complete sentences, elderly appearing  HENT:      Head: " Right brow laceration with dried blood, old bruising on the left cheek  Eyes:      Extraocular Movements: Extraocular movements intact.      Conjunctiva/sclera: Conjunctivae normal.      Pupils: Pupils are equal, round, and reactive to light.   Cardiovascular:      Pulses: Normal pulses.      Comments: HR 63  Pulmonary:      Effort: Pulmonary effort is normal. No respiratory distress.   Abdominal:      Comments: Abdomen is soft, non-tender, non-distended, non-rigid, no rebound, guarding, masses, no McBurney's point tenderness, no peritoneal signs, negative Rovsing sign, negative Ascencio sign.  No CVA tenderness to palpation. Benign abdomen.   Musculoskeletal:      Skin tear to the left dorsal forearm, bruising to the left hand at the base of the second digit     Cervical back: Normal range of motion. No rigidity.  No neck tenderness to palpation.  Skin:     General: Skin is warm and dry.      Capillary Refill: Capillary refill takes less than 2 seconds.   Neurological:      Mental Status: Alert.       DIAGNOSTIC STUDIES / PROCEDURES      RADIOLOGY  I have independently interpreted the diagnostic imaging associated with this visit and am waiting the final reading from the radiologist.   My preliminary interpretation is as follows: 2.6 right parietal subdural hematoma  Radiologist interpretation: 2.6 right parietal subdural hematoma    COURSE & MEDICAL DECISION MAKING    ED Observation Status? Yes; I am placing the patient in to an observation status due to a diagnostic uncertainty as well as therapeutic intensity. Patient placed in observation status at 2:30 AM, 8/24/2023.     Observation plan is as follows: Pending observation    Upon Reevaluation, the patient's condition has: not improved; and will be escalated to hospitalization.    Patient discharged from ED Observation status at 7:40 AM (Time) 8/24/2023 (Date).     INITIAL ASSESSMENT, COURSE AND PLAN  Care Narrative: CT head to rule intracranial hemorrhage or  skull fracture.  X-ray imaging to rule out fracture versus dislocation of the hand or forearm.  No evidence of cervical spine fracture, patient without tenderness.  Disposition pending imaging and wound care of the left forearm.    Electronically signed by: Joseph Porter M.D., 8/24/2023 1:56 AM    CT imaging with evidence of intracranial hemorrhage, 2.6 mm right parietal subdural hematoma.  Repeat CT imaging pending.  Dr. Pepe of neurosurgery has been consulted and recommends repeat CT imaging and a teg  Dr Eddy of trauma surgery   Has graciously accepted the patient to her service for admission.     This dictation has been created using voice recognition software. I am continuously working with the software to minimize the number of voice recognition errors and I have made every attempt to manually correct the errors within my dictation. However errors  related to this voice recognition software may still exist and should be interpreted within the appropriate context.     Electronically signed by: Joseph Porter M.D., 8/24/2023 7:42 AM    CRITICAL CARE  The very real possibilty of a deterioration of this patient's condition required the highest level of my preparedness for sudden, emergent intervention.  I provided critical care services, which included medication orders, frequent reevaluations of the patient's condition and response to treatment, ordering and reviewing test results, and discussing the case with various consultants.  The critical care time associated with the care of the patient was 42 minutes. Review chart for interventions. This time is exclusive of any other billable procedures.     LACERATION REPAIR PROCEDURE NOTE  The patient's identification was confirmed and consent was obtained.  This procedure was performed by Dr. Porter at 7:20 AM.  Site:  right brow  Sterile procedures observed  Anesthetic used (type and amt): 1% lidocaine without epinephrine  Suture type/size: 4-0  Ethilon  Length: 4 cm  # of Sutures: 4  Technique: Simple interrupted  Complexity: Simple  Antibx ointment applied  Tetanus ordered  Site anesthetized, irrigated with NS, explored without evidence of foreign body, wound well approximated, site covered with dry, sterile dressing. Patient tolerated procedure well without complications. Instructions for care discussed verbally and patient provided with additional written instructions for homecare and f/u.        DISPOSITION AND DISCUSSIONS  I have discussed management of the patient with the following physicians and JASBIR's:  Dr Eddy        FINAL DIAGNOSIS  1. SDH (subdural hematoma) (HCC)           Electronically signed by: Joseph Porter M.D., 8/24/2023 1:53 AM

## 2023-08-24 NOTE — ASSESSMENT & PLAN NOTE
Avoid Benzodiazepines and Anticholinergics  Frequent orientation  Open window blinds during the day  Avoid naps during the day  Family at bedside whenever possible  Ensure adequate sleep at night - use Melatonin preferably  Avoid early morning labs  Avoid vital signs during sleep  Ambulate if possible  Provide adequate pain control  Monitor for urinary retention  Prevent and manage constipation  Discontinue IVs, Catheters, Tubes, Lines, Cardiac monitors, SCDs if possible  May need trial of Seroquel

## 2023-08-24 NOTE — ED NOTES
Unable to complete med rec at this time.Waiting for Faxed MAR from [facility] [phone]. Pt also fills at [facility].

## 2023-08-24 NOTE — ED NOTES
Pt refused am meds, when asked about medication pt started yelling to leave her alone and let her sleep.

## 2023-08-24 NOTE — ASSESSMENT & PLAN NOTE
Chronic condition treated with aspirin.  Holding maintenance medication during acute traumatic illness.

## 2023-08-24 NOTE — ASSESSMENT & PLAN NOTE
Baseline. Unable to confirm medical history or medications.   Formal cognitive evaluation pending.

## 2023-08-24 NOTE — ED NOTES
Steri strip applied to pt L arm skin tear after being cleaned w/ sterile water and gauze. Gauze wrap placed over wound.

## 2023-08-24 NOTE — DISCHARGE PLANNING
[facility] Transitional [facility] Coordination    Referral from: SELENE Melton    Insurance Provider on Facesheet: M[employer]    Potential Rehab Diagnosis: TBI    Chart review indicates patient may have on going medical management and may have therapy needs to possibly meet inpatient rehab facility criteria with the goal of returning to community.    D/C support will need to be verified: Daughter    Physiatry consultation pended per protocol.  TX pending.  [facility].     Thank you for the referral.

## 2023-08-24 NOTE — ASSESSMENT & PLAN NOTE
Chronic condition treated with levothyroxine and liothyronine.  Resumed maintenance medication on admission.

## 2023-08-24 NOTE — ED NOTES
Bedside report given to Tavo WEBSTER. Pt resting comfortably and aware of POC with call light available and in reach. Pt on RA. Fall precautions in place and appropriate for pt. Pt reports no needs at this time. Appropriate equipment in room.

## 2023-08-24 NOTE — ED TRIAGE NOTES
Pt BIBA via [facility] as TBI    Chief Complaint   Patient presents with    Headache     Pt had multiple GLF falls, 1x GLF this evening onto R sided face. Pt takes 81mg ASA daily.     ABCs intact. A+Ox4. Skin PWD aside from skin tears noted to bilat arms, lac to forehead, bruising to left side of face. Vitals on the monitor.

## 2023-08-24 NOTE — PROGRESS NOTES
4 Eyes Skin Assessment Completed by DARIN Fisher and DARIN Mccormick.    Head Swelling and Redness, bruising Right eye laceration with sutures   Ears WDL  Nose WDL  Mouth WDL  Neck WDL  Breast/Chest WDL  Shoulder Blades WDL  Spine WDL  (R) Arm/Elbow/Hand Redness, Blanching, and Bruising  (L) Arm/Elbow/Hand Redness, Blanching, and Bruising skin tear with steri-strips   Abdomen WDL  Groin WDL  Scrotum/Coccyx/Buttocks Blanching and Discoloration  (R) Leg Bruising  (L) Leg Bruising  (R) Heel/Foot/Toe Redness, Blanching, and Bruising  (L) Heel/Foot/Toe Redness, Blanching, and Bruising          Devices In Places SCD's      Interventions In Place Heel Mepilex, Waffle Overlay, Pillows, Q2 Turns, Heels Loaded W/Pillows, and Pressure Redistribution Mattress    Possible Skin Injury Yes    Pictures Uploaded Into Epic Yes  Wound Consult Placed Yes  RN Wound Prevention Protocol Ordered Yes

## 2023-08-24 NOTE — CARE PLAN
The patient is Stable - Low risk of patient condition declining or worsening    Shift Goals  Clinical Goals: Safety, skin integrity  Patient Goals: Comfort  Family Goals: Rest    Progress made toward(s) clinical / shift goals:      Problem: Neuro Status  Goal: Neuro status will remain stable or improve  Outcome: Progressing  Note: A/Ox3, disoriented to situation. Q4h neuro checks in place.      Problem: Fall Risk  Goal: Patient will remain free from falls  Note: Fall precautions in place. Bed/strip alarm on, bed locked and in lowest position, non-skid socks provided, side rails up, call light within reach.   Outcome: Progressing     Problem: Skin Integrity  Goal: Skin integrity is maintained or improved  Note: Q2h turns, waffle overlay, heel/elbow protectors in place for preventative measures. Unable to place sacral mepilex due to incontinence.   Outcome: Progressing       Patient is not progressing towards the following goals:

## 2023-08-24 NOTE — ED NOTES
Pt resting comfortably with even chest rise and fall, reports no needs at this time, call light available and in reach.

## 2023-08-24 NOTE — ED NOTES
Patient was called and informed about her lab results and recommendations below.    Patient verbalized understanding of below. No further questions or concerns at this time.      Pt aware of POC, reports no needs at this time. Call light available and in reach.

## 2023-08-24 NOTE — H&P
TRAUMA HISTORY AND PHYSICAL    DATE OF SERVICE: 8/24/2023    ACTIVATION LEVEL: TBI alert.     HISTORY OF PRESENT ILLNESS: The patient is a 89 year-old woman who was injured when she fell out of bed at her nursing home.  She is awake and alert.  Her GCS is 15, although she is slightly hard of hearing.  She has a small laceration above the eyebrow and a skin tear on her left arm. She has had multiple falls at her home in the last month. She was originally thought to have a BIG 1 injury, but it turns out she takes aspirin at home.  She has been observed in the ER with frequent neuro evaluations and remains GCS 15.  Repeat CT shows reduced size of the subdural.     The patient was triaged to [facility] [place] in accordance with established pre hospital protocols. An expeditious primary and secondary survey with required adjuncts was conducted. See Trauma Narrator for full details.    PAST MEDICAL HISTORY:   Past Medical History:   Diagnosis Date    Acute conjunctivitis of both eyes 4/27/2023    4/10/23: Patient prescribed Tobradex x7days by MYNOR De Los Santos     Acute cystitis without hematuria 12/27/2022         PAST SURGICAL HISTORY:   Past Surgical History:   Procedure Laterality Date    HYSTERECTOMY, TOTAL ABDOMINAL            ALLERGIES: Duricef [cefadroxil]       CURRENT MEDICATIONS:   No outpatient medications have been marked as taking for the 8/24/23 encounter (Hospital Encounter).   Patient states that she knows she takes medicine every day, but she is unsure what she takes.  Aspirin is one of her medications and she takes 81 mg a day    FAMILY HISTORY: family history is not on file.  Reviewed and found to be non-contributory in regards to the above presentation    SOCIAL HISTORY:  reports that she quit smoking about 37 years ago. Her smoking use included cigarettes. She has never used smokeless tobacco. She reports that she does not drink alcohol and does not use drugs.  Patient denies habitual use  "of alcohol, tobacco, and illicit drugs    REVIEW OF SYSTEMS:   Comprehensive review of systems is negative with the exception of the aforementioned HPI, PMH, and PSH bullets in accordance with CMS guidelines.    PHYSICAL EXAMINATION:   Vital Signs: /62   Pulse 63   Temp 37 °C (98.6 °F) (Temporal)   Resp 16   Ht 1.651 m (5' 5\")   Wt 45.4 kg (100 lb)   SpO2 95%   Physical Exam  Vitals and nursing note reviewed. Exam conducted with a chaperone present.   Constitutional:       Appearance: Normal appearance.   HENT:      Head: Normocephalic.      Comments: Small laceration above eyebrow sutured closed.      Right Ear: External ear normal.      Left Ear: External ear normal.      Nose: Nose normal.      Mouth/Throat:      Mouth: Mucous membranes are moist.   Eyes:      Extraocular Movements: Extraocular movements intact.      Pupils: Pupils are equal, round, and reactive to light.   Cardiovascular:      Rate and Rhythm: Normal rate and regular rhythm.      Pulses: Normal pulses.   Pulmonary:      Effort: Pulmonary effort is normal.      Breath sounds: Normal breath sounds.   Abdominal:      General: Abdomen is flat. There is no distension.      Palpations: Abdomen is soft.   Musculoskeletal:         General: Normal range of motion.      Cervical back: Normal range of motion. No tenderness.   Skin:     General: Skin is warm and dry.      Capillary Refill: Capillary refill takes less than 2 seconds.      Comments: Skin tear left arm.    Neurological:      General: No focal deficit present.      Mental Status: She is alert and oriented to person, place, and time. Mental status is at baseline.   Psychiatric:         Mood and Affect: Mood normal.         LABORATORY VALUES:   Recent Labs     08/24/23  0740   WBC 10.8   RBC 3.38*   HEMOGLOBIN 10.4*   HEMATOCRIT 31.5*   MCV 93.2   MCH 30.8   MCHC 33.0   RDW 43.8   PLATELETCT 294   MPV 9.9     Recent Labs     08/24/23  0740   SODIUM 142   POTASSIUM 4.0   CHLORIDE 109 "   CO2 25   GLUCOSE 110*   BUN 25*   CREATININE 1.19   CALCIUM 8.3*     Recent Labs     08/24/23  0740   ASTSGOT 17   ALTSGPT 11   TBILIRUBIN 0.4   ALKPHOSPHAT 77   GLOBULIN 2.3            IMAGING:   CT-HEAD W/O   Final Result      1.  Trace RIGHT frontoparietal subdural blood, decreased in size and conspicuity since the prior study   2.  No new hemorrhage   3.  Atrophy   4.  White matter changes         DX-SHOULDER 2+ RIGHT   Final Result         1.  No acute traumatic bony injury.         DX-FOREARM LEFT   Final Result         1.  No acute traumatic bony injury.      DX-HAND 3+ LEFT   Final Result         1.  No acute traumatic bony injury.      CT-HEAD W/O   Final Result         1.  2.6 mm right parietal subdural hematoma   2.  Nonspecific white matter changes, commonly associated with small vessel ischemic disease.  Associated mild cerebral atrophy is noted.   3.  Atherosclerosis.      Based solely on CT findings, the brain injury guideline category is mBIG 1.      SDH < 4mm   IPH < 4mm   SAH < 3 sulci and < 1mm      The original BIG retrospective analysis found radiographic progression in 0% of BIG 1 patients and 2.6% BIG 2.      These findings were discussed with the patient's clinician, JULY DELGADO, on 8/24/2023 2:08 AM.          IMPRESSION AND PLAN:  Subdural hematoma, post-traumatic (HCC)- (present on admission)  Assessment & Plan  2.6 mm right parietal subdural hematoma.  Definitive plan pending.   Interval head CT pending.  Post traumatic pharmacologic seizure prophylaxis for 1 week.  Speech Language Pathology cognitive evaluation.  Alicia Pepe MD. Neurosurgeon. [facility].    Avulsion of arm, left, initial encounter- (present on admission)  Assessment & Plan  Wound care per emergency department.     Facial laceration- (present on admission)  Assessment & Plan  Laceration over right eye brow.   Suture closure in ED.     Contraindication to deep vein thrombosis (DVT) prophylaxis-  (present on admission)  Assessment & Plan  VTE prophylaxis initially contraindicated secondary to elevated bleeding risk.    Antiplatelet or antithrombotic long-term use- (present on admission)  Assessment & Plan  Daily aspirin use.   Admission TEG pending.    Frailty syndrome in geriatric patient- (present on admission)  Assessment & Plan  Underweight and fully functionally dependent.      Trauma- (present on admission)  Assessment & Plan  Ground level fall.   Non Trauma Activation.  Sita Eddy MD. Trauma Surgery.    Mild cognitive impairment- (present on admission)  Assessment & Plan  Baseline. Unable to confirm medical history or medications.   Formal cognitive evaluation pending.     CAD (coronary artery disease)- (present on admission)  Assessment & Plan  Chronic condition treated with aspirin.  Systemic anticoagulation held on admission.    Longstanding persistent atrial fibrillation (HCC)- (present on admission)  Assessment & Plan  Chronic condition treated with metoprolol and aspirin.  Systemic anticoagulation held on admission.  Resume metoprolol pending medication reconciliation.    Acquired hypothyroidism- (present on admission)  Assessment & Plan  Chronic condition treated with levothyroxine and liothyronine.  Resume pending medication reconciliation.    Essential hypertension- (present on admission)  Assessment & Plan  Chronic condition treated with lisinopril.  Resume pending medication reconciliation.        Aggregated care time spent evaluating, reviewing documentation, providing care, and managing this patient exclusive of procedures: 55 minutes  ____________________________________   Sita Eddy M.D.     [facility] / [place]     DD: 8/24/2023   DT: 6:52 AM

## 2023-08-24 NOTE — CONSULTS
Geriatric Medicine Consultation  Date of Service 8/24/2023    Referring Physician  Sita Eddy M.D.    Consulting Physician  Dontrell Kessler M.D.    Reason for Consultation  Frequent falls, cognitive impairment, vision and hearing impairment, urinary incontinence    History of Presenting Illness  89 y.o. female who presented 8/24/2023 with small subdural hematoma, right facial laceration, after a fall.  Unable to obtain any history from the patient.  History is taken from the patient's daughter who is at bedside.  Patient with known history of cognitive impairment, frequent falls, vision and hearing impairment, urinary incontinence, dependent on ADLs and IADLs, and resides in an assisted living facility.  Patient supposedly has a walker and wheelchair but refuses to use them.  She also has known hearing loss, but refuses to wear hearing aids.  Daughter states that they have diagnosed her with mild cognitive impairment, but she feels that her memory is much worse than the labeled diagnosis.  Daughter states that she can easily get confused, and frequently forgets conversations while they are occurring.  She has a POLST, and has healthcare and financial POA.    Review of Systems  Review of Systems   Unable to perform ROS: Medical condition       Past Medical History   has a past medical history of Acute conjunctivitis of both eyes (4/27/2023) and Acute cystitis without hematuria (12/27/2022).    Surgical History   has a past surgical history that includes hysterectomy, total abdominal.    Family History  family history is not on file.    Social History   reports that she quit smoking about 37 years ago. Her smoking use included cigarettes. She has never used smokeless tobacco. She reports that she does not drink alcohol and does not use drugs.    Living situation - Assisted Living  Marital status -   Primary caregiver - [facility]  Transportation - Daughter  POLST                   Yes  Health care POA   Yes    Financial POA       Yes     Medications  Prior to Admission Medications   Prescriptions Last Dose Informant Patient Reported? Taking?   Calcium Citrate-Vitamin D (CALCIUM + D PO) 8/23/2023 at 0700 MAR from Other Facility Yes Yes   Sig: Take 1 Tablet by mouth every day. 500 mg-400 mg chew   D-MANNOSE PO 8/23/2023 at 0700 MAR from Other Facility Yes Yes   Sig: Take 2 Capsules by mouth every day. 1300 mg with Cranberry extract   Pediatric Multiple Vit-Vit C (DALYVITE PO) 8/23/2023 at 0700 MAR from Other Facility Yes Yes   Sig: Take 1 Tablet by mouth every day.   amoxicillin-clavulanate (AUGMENTIN) 500-125 MG Tab 8/16/2023 at finished MAR from Other Facility Yes Yes   Sig: Take 1 Tablet by mouth 2 times a day. X 7 days   aspirin EC (ECOTRIN) 81 MG Tablet Delayed Response 8/23/2023 at 0700 MAR from Other Facility No No   Sig: Take one tab po daily   Patient taking differently: Take 81 mg by mouth every day. Take one tab po daily   ciprofloxacin (CIPRO) 250 MG Tab 8/5/2023 at finished MAR from Other Facility Yes Yes   Sig: Take 250 mg by mouth 2 times a day. X7 day   erythromycin 5 MG/GM Ointment 8/22/2023 at 2000 MAR from Other Facility No No   Sig: Apply 1 Application to both eyes at bedtime. Apply I/2 inch ribbon to both lower lids.   ibuprofen (MOTRIN) 200 MG Tab 8/3/2023 at 1217 MAR from Other Facility Yes Yes   Sig: Take 200 mg by mouth every 8 hours as needed for Mild Pain.   levothyroxine (SYNTHROID) 112 MCG Tab 8/23/2023 at 0700 MAR from Other Facility No No   Sig: Take one tab po daily   Patient taking differently: Take 112 mcg by mouth every morning on an empty stomach. Take one tab po daily  Brand Name EUTHYROX   liothyronine (CYTOMEL) 5 MCG Tab 8/23/2023 at 0700 MAR from Other Facility No No   Sig: Take one tab po daily   lisinopril (PRINIVIL) 10 MG Tab 8/23/2023 at 0700 MAR from Other Facility Yes No   Sig: Take 10 mg by mouth every day.   metoprolol tartrate (LOPRESSOR) 50 MG Tab 8/23/2023 at 0700 MAR  "from Other Facility Yes Yes   Sig: Take 50 mg by mouth every day.   mirtazapine (REMERON) 7.5 MG tablet 8/22/2023 at 2000 MAR from Other Facility Yes No   Sig: Take 7.5 mg by mouth every evening.   polyethyl glycol-propyl glycol (LUBRICATING EYE DROPS) 0.4-0.3 % Solution 8/23/2023 at 2000 MAR from Other Facility No No   Sig: Instill one drop in each eye QID   Patient taking differently: Administer 1 Drop into both eyes 4 times a day. Instill one drop in each eye QID   saccharomyces boulardii (FLORASTOR) 250 MG Cap 8/23/2023 at 0800 MAR from Other Facility Yes Yes   Sig: Take 250 mg by mouth every day.   sennosides-docusate sodium (SENOKOT-S) 8.6-50 MG tablet 8/22/2023 at 2000 MAR from Other Facility No No   Sig: Take one tab po daily  Hold for loose stools   Patient taking differently: Take 1 Tablet by mouth every evening. Take one tab po daily  Hold for loose stools      Facility-Administered Medications: None       Allergies  Allergies   Allergen Reactions    Duricef [Cefadroxil]      \"Rash\"       Geriatric Screening    ADL assistance              Yes  IADL assistance             Yes  Cognitive Impairment    Yes  Mood disorder                Yes  Polypharmacy                Yes  Vision impairment         Yes  Hearing impairment      Yes  Fall                                  Yes  Assistive device            Yes  Urinary incontinence    Yes  Nutrition issues            No   Food Insecurity            No   Pressure ulcer              No     Physical Exam  Temp:  [37 °C (98.6 °F)-37.1 °C (98.8 °F)] 37.1 °C (98.8 °F)  Pulse:  [59-69] 65  Resp:  [16-18] 16  BP: (102-187)/() 131/69  SpO2:  [95 %-97 %] 97 %  Physical Exam  Vitals and nursing note reviewed.   Constitutional:       Comments: Opens eyes to voice   HENT:      Head: Normocephalic.      Comments: Laceration at right supraorbital area     Nose: No congestion.      Mouth/Throat:      Mouth: Mucous membranes are moist.   Eyes:      Conjunctiva/sclera: " Conjunctivae normal.   Cardiovascular:      Rate and Rhythm: Normal rate. Rhythm irregular.   Pulmonary:      Effort: Pulmonary effort is normal.      Breath sounds: Normal breath sounds.   Abdominal:      General: There is no distension.      Tenderness: There is no abdominal tenderness. There is no guarding or rebound.   Musculoskeletal:      Cervical back: No tenderness.      Right lower leg: No edema.      Left lower leg: No edema.   Skin:     Comments: Skin tear left forearm   Neurological:      Mental Status: She is lethargic.      Comments: Opens eyes to voice  Good  bilaterally  Moves both legs equally             CAM  Unable to assess      Laboratory  Recent Labs     08/24/23  0740   WBC 10.8   RBC 3.38*   HEMOGLOBIN 10.4*   HEMATOCRIT 31.5*   MCV 93.2   MCH 30.8   MCHC 33.0   RDW 43.8   PLATELETCT 294   MPV 9.9     Recent Labs     08/24/23  0740   SODIUM 142   POTASSIUM 4.0   CHLORIDE 109   CO2 25   GLUCOSE 110*   BUN 25*   CREATININE 1.19   CALCIUM 8.3*                   Imaging  CT-HEAD W/O   Final Result      1.  Trace RIGHT frontoparietal subdural blood, decreased in size and conspicuity since the prior study   2.  No new hemorrhage   3.  Atrophy   4.  White matter changes         DX-SHOULDER 2+ RIGHT   Final Result         1.  No acute traumatic bony injury.         DX-FOREARM LEFT   Final Result         1.  No acute traumatic bony injury.      DX-HAND 3+ LEFT   Final Result         1.  No acute traumatic bony injury.      CT-HEAD W/O   Final Result         1.  2.6 mm right parietal subdural hematoma   2.  Nonspecific white matter changes, commonly associated with small vessel ischemic disease.  Associated mild cerebral atrophy is noted.   3.  Atherosclerosis.      Based solely on CT findings, the brain injury guideline category is mBIG 1.      SDH < 4mm   IPH < 4mm   SAH < 3 sulci and < 1mm      The original BIG retrospective analysis found radiographic progression in 0% of BIG 1 patients and  2.6% BIG 2.      These findings were discussed with the patient's clinician, JULY DELGADO, on 8/24/2023 2:08 AM.          Assessment/Plan  * Trauma- (present on admission)  Assessment & Plan  Trauma surgery-primary service    At risk for delirium- (present on admission)  Assessment & Plan  Avoid Benzodiazepines and Anticholinergics  Frequent orientation  Open window blinds during the day  Avoid naps during the day  Family at bedside whenever possible  Ensure adequate sleep at night - use Melatonin preferably  Avoid early morning labs  Avoid vital signs during sleep  Ambulate if possible  Provide adequate pain control  Monitor for urinary retention  Prevent and manage constipation  Discontinue IVs, Catheters, Tubes, Lines, Cardiac monitors, SCDs if possible    Frequent falls- (present on admission)  Assessment & Plan  PT and OT evaluations  We will need to check orthostatics    Frailty syndrome in geriatric patient- (present on admission)  Assessment & Plan  PT/OT/SLP evaluations  Check Vitamin B12 and TSH    Subdural hematoma, post-traumatic (HCC)- (present on admission)  Assessment & Plan  Neurochecks  Keppra for seizure prophylaxis  Neurosurgery following    Hearing loss- (present on admission)  Assessment & Plan  Hearing     Urinary incontinence- (present on admission)  Assessment & Plan  Bladder scans  Monitor for urinary retention    CKD (chronic kidney disease) stage 3, GFR 30-59 ml/min (HCC)- (present on admission)  Assessment & Plan  Follow BMP    Facial laceration- (present on admission)  Assessment & Plan  Wound care    Depression- (present on admission)  Assessment & Plan  Remeron    Cognitive impairment- (present on admission)  Assessment & Plan  Check vitamin B12 and TSH    CAD (coronary artery disease)- (present on admission)  Assessment & Plan  Uncertain extent of disease  Metoprolol  Hold aspirin  Was not on statin    Longstanding persistent atrial fibrillation (HCC)- (present on  admission)  Assessment & Plan  Metoprolol  Hold aspirin  Not on anticoagulation secondary to frequent falls    Venous insufficiency of both lower extremities- (present on admission)  Assessment & Plan  Monitor    Acquired hypothyroidism- (present on admission)  Assessment & Plan  Synthroid, Cytomel  Check TSH    Essential hypertension- (present on admission)  Assessment & Plan  Metoprolol, lisinopril

## 2023-08-24 NOTE — ASSESSMENT & PLAN NOTE
Chronic condition treated with metoprolol and aspirin.  Systemic anticoagulation held on admission.  Resume metoprolol on admission.

## 2023-08-25 PROCEDURE — 97602 WOUND(S) CARE NON-SELECTIVE: CPT

## 2023-08-25 PROCEDURE — 92610 EVALUATE SWALLOWING FUNCTION: CPT

## 2023-08-25 PROCEDURE — 770001 HCHG ROOM/CARE - MED/SURG/GYN PRIV*

## 2023-08-25 PROCEDURE — 99232 SBSQ HOSP IP/OBS MODERATE 35: CPT | Performed by: REGISTERED NURSE

## 2023-08-25 PROCEDURE — 700102 HCHG RX REV CODE 250 W/ 637 OVERRIDE(OP)

## 2023-08-25 PROCEDURE — 99232 SBSQ HOSP IP/OBS MODERATE 35: CPT | Performed by: FAMILY MEDICINE

## 2023-08-25 PROCEDURE — A9270 NON-COVERED ITEM OR SERVICE: HCPCS

## 2023-08-25 PROCEDURE — 700111 HCHG RX REV CODE 636 W/ 250 OVERRIDE (IP): Mod: JZ

## 2023-08-25 RX ORDER — HYDROMORPHONE HYDROCHLORIDE 1 MG/ML
0.5 INJECTION, SOLUTION INTRAMUSCULAR; INTRAVENOUS; SUBCUTANEOUS EVERY 4 HOURS PRN
Status: DISCONTINUED | OUTPATIENT
Start: 2023-08-25 | End: 2023-08-30 | Stop reason: HOSPADM

## 2023-08-25 RX ORDER — SODIUM CHLORIDE 9 MG/ML
INJECTION, SOLUTION INTRAVENOUS CONTINUOUS
Status: DISCONTINUED | OUTPATIENT
Start: 2023-08-25 | End: 2023-08-25

## 2023-08-25 RX ADMIN — ACETAMINOPHEN 650 MG: 325 TABLET, FILM COATED ORAL at 10:01

## 2023-08-25 RX ADMIN — LEVETIRACETAM 500 MG: 100 INJECTION, SOLUTION, CONCENTRATE INTRAVENOUS at 05:24

## 2023-08-25 RX ADMIN — MIRTAZAPINE 7.5 MG: 15 TABLET, FILM COATED ORAL at 20:31

## 2023-08-25 RX ADMIN — SENNOSIDES AND DOCUSATE SODIUM 1 TABLET: 50; 8.6 TABLET ORAL at 20:31

## 2023-08-25 RX ADMIN — ACETAMINOPHEN 650 MG: 325 TABLET, FILM COATED ORAL at 16:47

## 2023-08-25 ASSESSMENT — ENCOUNTER SYMPTOMS
BACK PAIN: 0
HEADACHES: 1
CONSTITUTIONAL NEGATIVE: 1
SENSORY CHANGE: 0
VOMITING: 0
DIZZINESS: 0
WHEEZING: 0
WEAKNESS: 1
FLANK PAIN: 0
CHILLS: 0
PALPITATIONS: 0
EYES NEGATIVE: 1
SHORTNESS OF BREATH: 0
NECK PAIN: 0
NERVOUS/ANXIOUS: 0
NAUSEA: 0
MUSCULOSKELETAL NEGATIVE: 1
MYALGIAS: 0
DOUBLE VISION: 0
BLURRED VISION: 0
GASTROINTESTINAL NEGATIVE: 1
RESPIRATORY NEGATIVE: 1
SPEECH CHANGE: 0
DIARRHEA: 0
DIAPHORESIS: 0
COUGH: 0
FOCAL WEAKNESS: 0
SORE THROAT: 0
HEADACHES: 0
ABDOMINAL PAIN: 0
PSYCHIATRIC NEGATIVE: 1
FEVER: 0
CARDIOVASCULAR NEGATIVE: 1
HEARTBURN: 0

## 2023-08-25 ASSESSMENT — PAIN DESCRIPTION - PAIN TYPE
TYPE: ACUTE PAIN
TYPE: OTHER (COMMENT)

## 2023-08-25 NOTE — PROGRESS NOTES
"Attempted to give pt scheduled meds. Pt took 1 tylenol, sipped her water and threw it on RN. Pt then yelled \"go to hell\" to RN   Daughter at bedside. Pt refuses meds at this time   "

## 2023-08-25 NOTE — WOUND TEAM
[facility]  Inpatient Services  Wound and Skin Care Brief Evaluation    Admission Date: 8/24/2023     Last order of IP CONSULT TO WOUND CARE was found on 8/24/2023 from Hospital Encounter on 8/24/2023     HPI, PMH, SH: Reviewed    Chief Complaint   Patient presents with    Headache     Pt had multiple GLF falls, 1x GLF this evening onto R sided face. Pt takes 81mg ASA daily.     Diagnosis: Trauma [T14.90XA]    Unit where seen by Wound Team: S194/02     Wound consult placed regarding R eyebrow, L FA, & sacrum. Chart and images reviewed. This discussed with bedside RN.  This clinician in to assess patient. Patient had just woken up, was confused and combative during assessment.  R eyebrow laceration approximated with sutures, dried drainage cleansed off as much as able. Okay to leave MAX.  R FA skin tear approximated with closure strips, no active drainage noted. Also okay to leave MAX and allow for closure strips to fall off on their own.  Sacrum intact and blanching, with natural hyperpigmentation noted.    No pressure injuries or advanced wound care needs identified. Wound consult completed. Wound team signing off, re-consult if patient has further advanced wound care needs.    Wound 08/24/23 Laceration Face Right Eyebrow (Active)   Date First Assessed/Time First Assessed: 08/24/23 1130   Present on Original Admission: Yes  Hand Hygiene Completed: Yes  Primary Wound Type: Laceration  Location: (c) Face  Laterality: Right  Wound Description (Comments): Eyebrow      Assessments 8/25/2023  2:00 PM   Wound Image     Site Assessment Pink   Periwound Assessment Dry;Intact   Margins Defined edges;Attached edges   Closure Sutures   Drainage Amount None   Treatments Cleansed   Wound Cleansing Normal Saline Irrigation   Dressing Status Open to Air   Wound Team Following Not following       Wound 08/24/23 Skin Tear Arm Ventral Left (Active)   Date First Assessed/Time First Assessed: 08/24/23 1130   Present on  Original Admission: Yes  Hand Hygiene Completed: Yes  Primary Wound Type: Skin Tear  Location: Arm  Wound Orientation: Ventral  Laterality: Left      Assessments 8/25/2023  2:00 PM   Wound Image     Site Assessment Red;Fragile   Periwound Assessment Ecchymosis;Fragile   Margins Defined edges;Attached edges   Closure Closure strips   Drainage Amount None       PREVENTATIVE INTERVENTIONS:    Q shift Ervin - performed per nursing policy  Q shift pressure point assessments - performed per nursing policy    Surface/Positioning  Standard/trauma mattress - Currently in Place  Reposition q 2 hours - Currently in Place  Waffle overlay  - Currently in Place    Offloading/Redistribution  Heel offloading dressing (Silicone dressing) - Currently in Place  Heel float boots (Prevalon boot) - Applied this Visit    Containment/Moisture Prevention    Purwick/Condom Cath - Currently in Place  Barrier paste - Applied this Visit

## 2023-08-25 NOTE — THERAPY
Physical Therapy Contact Note    Patient Name: Tracie Arauz  Age:  89 y.o., Sex:  female  Medical Record #: [id number]  Today's Date: 8/25/2023    PT consult received, pt now planned for half-way on hospice; per chart review pt was dependent for ADLs/IADLs and has w/c and FWW but refuses to use it; no role for acute PT identified, pt appears to need 24/7 support for the remainder of her life, no goals for PT to work toward given hospice decision from family; please reconsult should this d/c plan change/family change goals;     Jasmyn ODOM, PT,  [phone]

## 2023-08-25 NOTE — PROGRESS NOTES
Geriatric Medicine Daily Progress Note      Date of Service  8/25/2023    Chief Complaint  89 y.o. female admitted 8/24/2023 with fall    Hospital Course  Admitted  with small subdural hematoma, right facial laceration, after a fall.  Unable to obtain any history from the patient.  History is taken from the patient's daughter who is at bedside.  Patient with known history of cognitive impairment, frequent falls, vision and hearing impairment, urinary incontinence, dependent on ADLs and IADLs, and resides in an assisted living facility.  Patient supposedly has a walker and wheelchair but refuses to use them.  She also has known hearing loss, but refuses to wear hearing aids.  Daughter states that they have diagnosed her with mild cognitive impairment, but she feels that her memory is much worse than the labeled diagnosis.  Daughter states that she can easily get confused, and frequently forgets conversations while they are occurring.  She has a POLST, and has healthcare and financial POA.    Interval Problem Update  SDH - denies headache  HTN - sbp 132-142  Afib - rate 62-77    Consultants/Specialty  Trauma surgery  Neurosurgery    Code Status  DNR/DNI    Disposition  postacute placement versus hospice    Review of Systems  Review of Systems   Constitutional:  Positive for malaise/fatigue. Negative for chills, diaphoresis and fever.   HENT:  Positive for hearing loss. Negative for congestion and sore throat.    Eyes:  Negative for blurred vision.   Respiratory:  Negative for cough, shortness of breath and wheezing.    Cardiovascular:  Negative for chest pain, palpitations and leg swelling.   Gastrointestinal:  Negative for abdominal pain, diarrhea, heartburn, nausea and vomiting.   Genitourinary:  Negative for dysuria, flank pain and hematuria.   Musculoskeletal:  Negative for back pain, joint pain, myalgias and neck pain.   Skin:  Negative for rash.   Neurological:  Positive for weakness. Negative for dizziness,  sensory change, speech change, focal weakness and headaches.   Psychiatric/Behavioral:  The patient is not nervous/anxious.         Physical Exam  Temp:  [36.8 °C (98.2 °F)-37 °C (98.6 °F)] 36.8 °C (98.2 °F)  Pulse:  [62-77] 77  Resp:  [16-18] 16  BP: (132-142)/(66-75) 133/75  SpO2:  [94 %-99 %] 94 %    Physical Exam  Vitals and nursing note reviewed.   HENT:      Head: Normocephalic.      Comments: Laceration at right supraorbital area     Nose: No congestion.      Mouth/Throat:      Mouth: Mucous membranes are moist.   Eyes:      Conjunctiva/sclera: Conjunctivae normal.   Cardiovascular:      Rate and Rhythm: Normal rate. Rhythm irregular.      Heart sounds: Murmur heard.   Pulmonary:      Effort: Pulmonary effort is normal.      Breath sounds: Normal breath sounds.   Abdominal:      General: There is no distension.      Tenderness: There is no abdominal tenderness. There is no guarding or rebound.   Musculoskeletal:      Cervical back: No tenderness.   Skin:     Comments: Skin tear to left forearm   Neurological:      General: No focal deficit present.      Mental Status: She is alert.      Cranial Nerves: No cranial nerve deficit.      Comments: Oriented to person and place   Psychiatric:         Speech: Speech is delayed and tangential.         Cognition and Memory: She exhibits impaired recent memory.         CAM  Acute onset and fluctuating course   No   Inattention                                         No   Disorganized thinking                        No   Altered level of consciousness          No     Medication Review    Yes    Laboratory  Recent Labs     08/24/23  0740   WBC 10.8   RBC 3.38*   HEMOGLOBIN 10.4*   HEMATOCRIT 31.5*   MCV 93.2   MCH 30.8   MCHC 33.0   RDW 43.8   PLATELETCT 294   MPV 9.9     Recent Labs     08/24/23  0740   SODIUM 142   POTASSIUM 4.0   CHLORIDE 109   CO2 25   GLUCOSE 110*   BUN 25*   CREATININE 1.19   CALCIUM 8.3*                   Imaging  CT-HEAD W/O   Final Result      1.   Trace RIGHT frontoparietal subdural blood, decreased in size and conspicuity since the prior study   2.  No new hemorrhage   3.  Atrophy   4.  White matter changes         DX-SHOULDER 2+ RIGHT   Final Result         1.  No acute traumatic bony injury.         DX-FOREARM LEFT   Final Result         1.  No acute traumatic bony injury.      DX-HAND 3+ LEFT   Final Result         1.  No acute traumatic bony injury.      CT-HEAD W/O   Final Result         1.  2.6 mm right parietal subdural hematoma   2.  Nonspecific white matter changes, commonly associated with small vessel ischemic disease.  Associated mild cerebral atrophy is noted.   3.  Atherosclerosis.      Based solely on CT findings, the brain injury guideline category is mBIG 1.      SDH < 4mm   IPH < 4mm   SAH < 3 sulci and < 1mm      The original BIG retrospective analysis found radiographic progression in 0% of BIG 1 patients and 2.6% BIG 2.      These findings were discussed with the patient's clinician, JULY DELGADO, on 8/24/2023 2:08 AM.           Assessment/Plan  * Trauma- (present on admission)  Assessment & Plan  Trauma surgery-primary service    At risk for delirium- (present on admission)  Assessment & Plan  Avoid Benzodiazepines and Anticholinergics  Frequent orientation  Open window blinds during the day  Avoid naps during the day  Family at bedside whenever possible  Ensure adequate sleep at night - use Melatonin preferably  Avoid early morning labs  Avoid vital signs during sleep  Ambulate if possible  Provide adequate pain control  Monitor for urinary retention  Prevent and manage constipation  Discontinue IVs, Catheters, Tubes, Lines, Cardiac monitors, SCDs if possible    Frequent falls- (present on admission)  Assessment & Plan  PT and OT evaluations  will need to check orthostatics    Frailty syndrome in geriatric patient- (present on admission)  Assessment & Plan  PT/OT/SLP   Check Vitamin B12 and TSH  Hospice consulted by trauma  surgery    Subdural hematoma, post-traumatic (HCC)- (present on admission)  Assessment & Plan  Neurochecks  Keppra for seizure prophylaxis  Neurosurgery following    Hearing loss- (present on admission)  Assessment & Plan  Hearing amplifier/head set    Urinary incontinence- (present on admission)  Assessment & Plan  Bladder scans  Monitor for urinary retention    CKD (chronic kidney disease) stage 3, GFR 30-59 ml/min (HCC)- (present on admission)  Assessment & Plan  Follow BMP    Facial laceration- (present on admission)  Assessment & Plan  Wound care    Depression- (present on admission)  Assessment & Plan  Remeron    Cognitive impairment- (present on admission)  Assessment & Plan  Check vitamin B12 and TSH    CAD (coronary artery disease)- (present on admission)  Assessment & Plan  Uncertain extent of disease  Metoprolol  Hold ASA  was not on statin    Longstanding persistent atrial fibrillation (HCC)- (present on admission)  Assessment & Plan  Metoprolol  Hold ASA  Not on anticoagulation secondary to frequent falls    Venous insufficiency of both lower extremities- (present on admission)  Assessment & Plan  Monitor    Acquired hypothyroidism- (present on admission)  Assessment & Plan  Synthroid, Cytomel  Check TSH    Essential hypertension- (present on admission)  Assessment & Plan  Metoprolol, Lisinopril         VTE prophylaxis: SCD

## 2023-08-25 NOTE — CARE PLAN
The patient is Stable - Low risk of patient condition declining or worsening    Shift Goals  Clinical Goals: safety, eat  Patient Goals: comfort  Family Goals: Rest    Progress made toward(s) clinical / shift goals:  pt reports comfort & has eaten   Problem: Knowledge Deficit - Standard  Goal: Patient and family/care givers will demonstrate understanding of plan of care, disease process/condition, diagnostic tests and medications  Description: Target End Date:  1-3 days or as soon as patient condition allows    Document in Patient Education    1.  Patient and family/caregiver oriented to unit, equipment, visitation policy and means for communicating concern  2.  Complete/review Learning Assessment  3.  Assess knowledge level of disease process/condition, treatment plan, diagnostic tests and medications  4.  Explain disease process/condition, treatment plan, diagnostic tests and medications  Outcome: Progressing     Problem: Fall Risk  Goal: Patient will remain free from falls  Description: Target End Date:  Prior to discharge or change in level of care    Document interventions on the [facility] Fall Risk Assessment    1.  Assess for fall risk factors  2.  Implement fall precautions  Outcome: Progressing     Problem: Neuro Status  Goal: Neuro status will remain stable or improve  Description: Target End Date:  Prior to discharge or change in level of care    Document on Neuro assessment in the Assessment flowsheet    1.  Assess and monitor neurologic status per provider order/protocol/unit policy  2.  Assess level of consciousness and orientation  3.  Assess for speech, dysarthria, dysphagia, facial symmetry  4.  Assess visual field, eye movements, gaze preference, pupil reaction and size  5.  Assess muscle strength and motor response in all four extremities  6.  Assess for sensation (numbness and tingling)  7.  Assess basic neuro reflexes (cough, gag, corneal)  8.  Identify changes in neuro status and report to  provider for testing/treatment orders  Outcome: Progressing     Problem: Respiratory  Goal: Patient will achieve/maintain optimum respiratory ventilation and gas exchange  Description: Target End Date:  Prior to discharge or change in level of care    Document on Assessment flowsheet    1.  Assess and monitor rate, rhythm, depth and effort of respiration  2.  Breath sounds assessed qshift and/or as needed  3.  Assess O2 saturation, administer/titrate oxygen as ordered  4.  Position patient for maximum ventilatory efficiency  5.  Turn, cough, and deep breath with splinting to improve effectiveness  6.  Collaborate with RT to administer medication/treatments per order  7.  Encourage use of incentive spirometer and encourage patient to cough after use and utilize splinting techniques if applicable  8.  Airway suctioning  9.  Monitor sputum production for changes in color, consistency and frequency  10. Perform frequent oral hygiene  11. Alternate physical activity with rest periods  Outcome: Progressing       Patient is not progressing towards the following goals:       Female Completion Statement: After discussing her treatment course we decided to discontinue isotretinoin therapy at this time. I explained that she would need to continue her birth control methods for at least one month after the last dosage. She should also get a pregnancy test one month after the last dose. She shouldn't donate blood for one month after the last dose. She should call with any new symptoms of depression.

## 2023-08-25 NOTE — DIETARY
Nutrition Services:    Low BMI noted on RD's screening list.     Pt is currently on a regular diet and per chart pt PO was % of breakfast this morning. Nutrition admit screen was not completed. PO PTA is unknown.     Ht: 165.1 cm, Wt: 45.36 kg (estimated), BMI 16.64. Pt's wt may not be accurate because it was estimated.     Per progress notes, pt's family is pursuing hospice care.      RD will follow for official initiation of hospice care.

## 2023-08-25 NOTE — PROGRESS NOTES
Received call from patient's daughter, Sendy requesting pt to be on hospice   Clarified with REMA Jauregui, that this is family's wish   Notified APRN. Consult already in. APRN volalted hospice to follow up

## 2023-08-25 NOTE — PROGRESS NOTES
Trauma / Surgical Daily Progress Note    Date of Service  8/25/2023    Chief Complaint  89 y.o. female admitted 8/24/2023 with ICH after a fall.    Interval Events  Awake, alert, oriented to person, place, time.  Dysphagia overnight, No IV fluids or enteral feeds initiated per POLST.  Cleared for diet by SLP this AM.  Hospice referral placed per request of family, discussed with palliative APRN.   Geriatric consult completed, appreciate.     Review of Systems  Review of Systems   Constitutional: Negative.    Eyes: Negative.  Negative for blurred vision and double vision.   Respiratory: Negative.     Cardiovascular: Negative.    Gastrointestinal: Negative.    Genitourinary: Negative.    Musculoskeletal: Negative.    Neurological:  Positive for headaches. Negative for dizziness and focal weakness.   Psychiatric/Behavioral: Negative.     All other systems reviewed and are negative.       Vital Signs  Temp:  [36.8 °C (98.2 °F)-37.1 °C (98.8 °F)] 36.8 °C (98.2 °F)  Pulse:  [62-77] 77  Resp:  [16-18] 16  BP: (130-142)/(58-75) 133/75  SpO2:  [94 %-99 %] 94 %    Physical Exam  Physical Exam  Vitals and nursing note reviewed.   Constitutional:       General: She is awake. She is not in acute distress.     Appearance: Normal appearance.   HENT:      Head: Normocephalic and atraumatic.      Comments: Laceration right orbit, well approximated.   Periorbital ecchymosis, contusion, right side.      Nose: Nose normal.      Mouth/Throat:      Mouth: Mucous membranes are moist.      Pharynx: Oropharynx is clear.   Eyes:      Extraocular Movements: Extraocular movements intact.      Conjunctiva/sclera: Conjunctivae normal.      Pupils: Pupils are equal, round, and reactive to light.   Cardiovascular:      Rate and Rhythm: Normal rate and regular rhythm.      Pulses: Normal pulses.   Pulmonary:      Effort: Pulmonary effort is normal. No respiratory distress.   Chest:      Chest wall: No swelling, tenderness or crepitus.    Abdominal:      General: Abdomen is flat. Bowel sounds are normal.      Palpations: Abdomen is soft.   Musculoskeletal:         General: Normal range of motion.      Cervical back: Full passive range of motion without pain, normal range of motion and neck supple.   Skin:     General: Skin is warm and dry.      Capillary Refill: Capillary refill takes less than 2 seconds.      Comments: Avulsion left arm well approximated with steri strips.   Moves all extremities.    Neurological:      General: No focal deficit present.      Mental Status: She is alert and oriented to person, place, and time. Mental status is at baseline.      GCS: GCS eye subscore is 4. GCS verbal subscore is 5. GCS motor subscore is 6.      Sensory: Sensation is intact.      Motor: Motor function is intact.   Psychiatric:         Mood and Affect: Mood normal.         Laboratory  No results found for this or any previous visit (from the past 24 hour(s)).      Fluids  No intake or output data in the 24 hours ending 08/25/23 1001    Core Measures & Quality Metrics  Radiology images reviewed, Labs reviewed and Medications reviewed  Vargas catheter: No Vargas      DVT Prophylaxis: Contraindicated - High bleeding risk    Ulcer prophylaxis: Not indicated    Assessed for rehab: Patient was assess for and/or received rehabilitation services during this hospitalization    RAP Score Total: 7    CAGE Results: not completed Blood Alcohol>0.08: no       Assessment/Plan  * Trauma- (present on admission)  Assessment & Plan  Ground level fall.   Non Trauma Activation.  Sita Eddy MD. Trauma Surgery.    Subdural hematoma, post-traumatic (HCC)- (present on admission)  Assessment & Plan  2.6 mm right parietal subdural hematoma.  Non-operative management.   Interval head CT stable showing decrease size in subdural.  Post traumatic pharmacologic seizure prophylaxis for 1 week.  Speech Language Pathology cognitive evaluation.  Alicia Pepe MD. Neurosurgeon.  [facility].    Encounter for geriatric assessment- (present on admission)  Assessment & Plan  8/24 Geriatric medicine consulted, formal consult pending.  Dr. Dontrell Kessler.    Avulsion of arm, left, initial encounter- (present on admission)  Assessment & Plan  Wound care per emergency department.     Facial laceration- (present on admission)  Assessment & Plan  Laceration over right eye brow.   Suture closure in ED.     Frailty syndrome in geriatric patient- (present on admission)  Assessment & Plan  Underweight and fully functionally dependent.      Contraindication to deep vein thrombosis (DVT) prophylaxis- (present on admission)  Assessment & Plan  VTE prophylaxis initially contraindicated secondary to elevated bleeding risk.    Antiplatelet or antithrombotic long-term use- (present on admission)  Assessment & Plan  Daily aspirin use.   Admission TEG AA 37%, no platelets indicated.     Cognitive impairment- (present on admission)  Assessment & Plan  Baseline. Unable to confirm medical history or medications.   Formal cognitive evaluation pending.     CAD (coronary artery disease)- (present on admission)  Assessment & Plan  Chronic condition treated with aspirin.  Holding maintenance medication during acute traumatic illness.    Longstanding persistent atrial fibrillation (HCC)- (present on admission)  Assessment & Plan  Chronic condition treated with metoprolol and aspirin.  Systemic anticoagulation held on admission.  Resume metoprolol on admission.    Acquired hypothyroidism- (present on admission)  Assessment & Plan  Chronic condition treated with levothyroxine and liothyronine.  Resumed maintenance medication on admission.    Essential hypertension- (present on admission)  Assessment & Plan  Chronic condition treated with lisinopril.  Resumed maintenance medication on admission.        Discussed patient condition with RN, Patient, and trauma surgery .

## 2023-08-25 NOTE — THERAPY
Occupational Therapy Contact Note    Patient Name: Tracie Arauz  Age:  89 y.o., Sex:  female  Medical Record #: [id number]  Today's Date: 8/25/2023    OT consult received, pts family now electing for [facility] on hospice, has dependent assistance with all ADLs/IADLs. Will complete OT order at this time. Patient will not be actively followed for occupational therapy services at this time, however may be seen if requested by physician for 1 more visit within 30 days to address any discharge or equipment needs.       Sage Koch OTD, OTR/L

## 2023-08-25 NOTE — THERAPY
"Speech Language Pathology   Clinical Swallow Evaluation     Patient Name: Tracie Arauz  AGE:  89 y.o., SEX:  female  Medical Record #: [id number]  Date of Service: 8/25/2023      History of Present Illness    89 y/o admitted on 8/24 for TBI after falling out of bed at nursing home. Not seen by SLP before at [facility].    CT of head 8/24: \"Trace RIGHT frontoparietal subdural blood, decreased in size and conspicuity since the prior study. No new hemorrhage.\"      PMHx:  Mild cognitive impairment, cystitis without hematuria    General Information:  Vitals  O2 (LPM): 0  O2 Delivery Device: None - Room Air  Level of Consciousness: Alert  Orientation:  (oriented x3 per RN)         Prior Living Situation & Level of Function:  Housing / Facility: Skilled Nursing Facility  Lives with - Patient's Self Care Capacity: Attendant / Paid Care Giver  Comments: Pt endorsed coming from SNF but unable to state which one. Not a reliable historian.  Communication: per EMR, hx of mild cognitive impairment  Swallowing: pt denied hx of dysphagia       Oral Mechanism Evaluation:  Dentition: Fair   Facial Symmetry: Central right facial droop (trace right drooop/weakness)  Lingual Observations: Midline  Motor Speech: WFL          Laryngeal Function:  Secretion Management: Adequate       Subjective  Pt awake and cooperative during session. Intermittent perseveration noted as pt asked this clinician multiple times for cinnamon toast. She denied hx of dysphagia at baseline and no family at bedside to clarify.       Assessment  Current Method of Nutrition: NPO until cleared by speech pathology  Positioning: Ramirez's (60-90 degrees)  Bolus Administration: Patient  O2 (LPM): 0 O2 Delivery Device: None - Room Air  Factor(s) Affecting Performance: None      Swallowing Trials:  Swallowing Trials  Thin Liquid (TN0): WFL  Liquidised (LQ3): WFL  Regular (RG7): WFL      Comments: No overt s/sx of aspiration noted with trials of thin liquids via cup sip and " "consecutive cup sip, apple sauce, or solids. Pt fed self without difficulty and vocal quality was clear following the swallow. She denied globus sensation. Mastication timely with solids.      Clinical Impressions  Currently pt is presenting with a functional oropharyngeal swallow. Per RN, pt failed RN screen yesterday so would recommend monitoring during meals to ensure tolerance of PO. Recommend initiation of a regular/thin liquid diet.     Recommendations  Diet Consistency: regular/thins  Instrumentation: None indicated at this time  Medication: As tolerated  Supervision: Close supervision - patient may be left alone for less than 5 minutes at a time  Positioning: Fully upright and midline during oral intake  Risk Management : Small bites/sips  Oral Care: Q4h         SLP Treatment Plan  Treatment Plan: Dysphagia Treatment  SLP Frequency: 3x Per Week  Estimated Duration: Until Therapy Goals Met      Anticipated Discharge Needs  Discharge Recommendations: Anticipate that the patient will have no further speech therapy needs after discharge from the hospital   Therapy Recommendations Upon DC: Not Indicated        Patient / Family Goals  Patient / Family Goal #1: \"santoyo and cinnamon toast\"  Short Term Goals  Short Term Goal # 1: Pt will consume a regular/thin liquid diet with no overt s/sx of aspiration      JIM Cano   "

## 2023-08-25 NOTE — DISCHARGE PLANNING
Would appreciate [place] priscilla once appropriate.     [facility].  [facility] will no longer follow.  Please reach out to myself with any interval changes/questions.

## 2023-08-25 NOTE — CARE PLAN
The patient is Stable - Low risk of patient condition declining or worsening    Shift Goals  Clinical Goals: Safety, monitor neuros  Patient Goals: Rest  Family Goals: Rest    Progress made toward(s) clinical / shift goals:    Problem: Fall Risk  Goal: Patient will remain free from falls  Outcome: Progressing     Problem: Neuro Status  Goal: Neuro status will remain stable or improve  Outcome: Progressing     Problem: Urinary Elimination  Goal: Establish and maintain regular urinary output  Outcome: Progressing       Patient is not progressing towards the following goals:

## 2023-08-26 PROBLEM — R41.0 DELIRIUM: Status: ACTIVE | Noted: 2023-08-24

## 2023-08-26 PROCEDURE — A9270 NON-COVERED ITEM OR SERVICE: HCPCS

## 2023-08-26 PROCEDURE — 700102 HCHG RX REV CODE 250 W/ 637 OVERRIDE(OP)

## 2023-08-26 PROCEDURE — 99232 SBSQ HOSP IP/OBS MODERATE 35: CPT | Performed by: REGISTERED NURSE

## 2023-08-26 PROCEDURE — 99232 SBSQ HOSP IP/OBS MODERATE 35: CPT | Performed by: FAMILY MEDICINE

## 2023-08-26 PROCEDURE — 770001 HCHG ROOM/CARE - MED/SURG/GYN PRIV*

## 2023-08-26 RX ADMIN — POLYETHYLENE GLYCOL 3350 1 PACKET: 17 POWDER, FOR SOLUTION ORAL at 06:18

## 2023-08-26 RX ADMIN — LEVETIRACETAM 500 MG: 500 TABLET, FILM COATED ORAL at 16:36

## 2023-08-26 RX ADMIN — ACETAMINOPHEN 650 MG: 325 TABLET, FILM COATED ORAL at 06:18

## 2023-08-26 RX ADMIN — DOCUSATE SODIUM 100 MG: 100 CAPSULE, LIQUID FILLED ORAL at 06:18

## 2023-08-26 RX ADMIN — DOCUSATE SODIUM 100 MG: 100 CAPSULE, LIQUID FILLED ORAL at 16:37

## 2023-08-26 RX ADMIN — METOPROLOL TARTRATE 50 MG: 25 TABLET, FILM COATED ORAL at 06:17

## 2023-08-26 RX ADMIN — MAGNESIUM HYDROXIDE 30 ML: 400 SUSPENSION ORAL at 06:19

## 2023-08-26 RX ADMIN — ACETAMINOPHEN 650 MG: 325 TABLET, FILM COATED ORAL at 23:45

## 2023-08-26 RX ADMIN — LEVETIRACETAM 500 MG: 500 TABLET, FILM COATED ORAL at 06:18

## 2023-08-26 RX ADMIN — LISINOPRIL 10 MG: 10 TABLET ORAL at 06:18

## 2023-08-26 RX ADMIN — ACETAMINOPHEN 650 MG: 325 TABLET, FILM COATED ORAL at 12:35

## 2023-08-26 RX ADMIN — SENNOSIDES AND DOCUSATE SODIUM 1 TABLET: 50; 8.6 TABLET ORAL at 22:01

## 2023-08-26 RX ADMIN — ACETAMINOPHEN 650 MG: 325 TABLET, FILM COATED ORAL at 16:37

## 2023-08-26 RX ADMIN — LEVOTHYROXINE SODIUM 112 MCG: 0.11 TABLET ORAL at 06:18

## 2023-08-26 RX ADMIN — MIRTAZAPINE 7.5 MG: 15 TABLET, FILM COATED ORAL at 22:01

## 2023-08-26 RX ADMIN — LIOTHYRONINE SODIUM 5 MCG: 5 TABLET ORAL at 06:18

## 2023-08-26 ASSESSMENT — ENCOUNTER SYMPTOMS
DIARRHEA: 0
DIZZINESS: 0
ABDOMINAL PAIN: 0
EYES NEGATIVE: 1
DOUBLE VISION: 0
WEAKNESS: 1
SPEECH CHANGE: 0
SORE THROAT: 0
PALPITATIONS: 0
CHILLS: 0
CONSTITUTIONAL NEGATIVE: 1
CARDIOVASCULAR NEGATIVE: 1
HEADACHES: 1
FOCAL WEAKNESS: 0
NERVOUS/ANXIOUS: 0
RESPIRATORY NEGATIVE: 1
WHEEZING: 0
SENSORY CHANGE: 0
PSYCHIATRIC NEGATIVE: 1
COUGH: 0
MUSCULOSKELETAL NEGATIVE: 1
FEVER: 0
FLANK PAIN: 0
HEADACHES: 0
GASTROINTESTINAL NEGATIVE: 1
NAUSEA: 0
VOMITING: 0
SHORTNESS OF BREATH: 0
MYALGIAS: 0
DIAPHORESIS: 0
NECK PAIN: 0
HEARTBURN: 0
BLURRED VISION: 0
BACK PAIN: 0

## 2023-08-26 ASSESSMENT — PAIN DESCRIPTION - PAIN TYPE
TYPE: ACUTE PAIN

## 2023-08-26 ASSESSMENT — FIBROSIS 4 INDEX: FIB4 SCORE: 1.6

## 2023-08-26 NOTE — PROGRESS NOTES
Geriatric Medicine Daily Progress Note      Date of Service  8/26/2023    Chief Complaint  89 y.o. female admitted 8/24/2023 with fall    Hospital Course  Admitted  with small subdural hematoma, right facial laceration, after a fall.  Unable to obtain any history from the patient.  History is taken from the patient's daughter who is at bedside.  Patient with known history of cognitive impairment, frequent falls, vision and hearing impairment, urinary incontinence, dependent on ADLs and IADLs, and resides in an assisted living facility.  Patient supposedly has a walker and wheelchair but refuses to use them.  She also has known hearing loss, but refuses to wear hearing aids.  Daughter states that they have diagnosed her with mild cognitive impairment, but she feels that her memory is much worse than the labeled diagnosis.  Daughter states that she can easily get confused, and frequently forgets conversations while they are occurring.  She has a POLST, and has healthcare and financial POA.    Interval Problem Update  SDH - denies headache  HTN - sbp 140-157  Afib - rate 63-67  Delirium - episode of agitation yesterday    Consultants/Specialty  Trauma surgery  Neurosurgery    Code Status  DNR/DNI    Disposition  postacute placement versus hospice    Review of Systems  Review of Systems   Constitutional:  Positive for malaise/fatigue. Negative for chills, diaphoresis and fever.   HENT:  Positive for hearing loss. Negative for congestion and sore throat.    Eyes:  Negative for blurred vision.   Respiratory:  Negative for cough, shortness of breath and wheezing.    Cardiovascular:  Negative for chest pain, palpitations and leg swelling.   Gastrointestinal:  Negative for abdominal pain, diarrhea, heartburn, nausea and vomiting.   Genitourinary:  Negative for dysuria, flank pain and hematuria.   Musculoskeletal:  Negative for back pain, joint pain, myalgias and neck pain.   Skin:  Negative for rash.   Neurological:   Positive for weakness. Negative for dizziness, sensory change, speech change, focal weakness and headaches.   Psychiatric/Behavioral:  The patient is not nervous/anxious.         Physical Exam  Temp:  [36.4 °C (97.5 °F)-37.3 °C (99.1 °F)] 36.4 °C (97.5 °F)  Pulse:  [63-67] 63  Resp:  [16] 16  BP: (140-157)/(64-85) 146/64  SpO2:  [96 %-97 %] 97 %    Physical Exam  Vitals and nursing note reviewed.   HENT:      Head: Normocephalic.      Comments: Laceration at right supraorbital area with stitches  Right facial ecchymosis     Nose: No congestion.      Mouth/Throat:      Mouth: Mucous membranes are moist.   Eyes:      Conjunctiva/sclera: Conjunctivae normal.   Cardiovascular:      Rate and Rhythm: Normal rate. Rhythm irregular.      Heart sounds: Murmur heard.   Pulmonary:      Effort: Pulmonary effort is normal.      Breath sounds: Normal breath sounds.   Abdominal:      General: There is no distension.      Tenderness: There is no abdominal tenderness. There is no guarding or rebound.   Musculoskeletal:      Cervical back: No tenderness.   Skin:     Comments: Skin tear to left forearm   Neurological:      General: No focal deficit present.      Mental Status: She is alert.      Cranial Nerves: No cranial nerve deficit.      Comments: Oriented to person and place   Psychiatric:         Speech: Speech is delayed and tangential.         Cognition and Memory: She exhibits impaired recent memory.         CAM  Acute onset and fluctuating course   Yes  Inattention                                         No   Disorganized thinking                        Yes  Altered level of consciousness          No     Medication Review    Yes    Laboratory  Recent Labs     08/24/23  0740   WBC 10.8   RBC 3.38*   HEMOGLOBIN 10.4*   HEMATOCRIT 31.5*   MCV 93.2   MCH 30.8   MCHC 33.0   RDW 43.8   PLATELETCT 294   MPV 9.9       Recent Labs     08/24/23  0740   SODIUM 142   POTASSIUM 4.0   CHLORIDE 109   CO2 25   GLUCOSE 110*   BUN 25*    CREATININE 1.19   CALCIUM 8.3*                     Imaging  CT-HEAD W/O   Final Result      1.  Trace RIGHT frontoparietal subdural blood, decreased in size and conspicuity since the prior study   2.  No new hemorrhage   3.  Atrophy   4.  White matter changes         DX-SHOULDER 2+ RIGHT   Final Result         1.  No acute traumatic bony injury.         DX-FOREARM LEFT   Final Result         1.  No acute traumatic bony injury.      DX-HAND 3+ LEFT   Final Result         1.  No acute traumatic bony injury.      CT-HEAD W/O   Final Result         1.  2.6 mm right parietal subdural hematoma   2.  Nonspecific white matter changes, commonly associated with small vessel ischemic disease.  Associated mild cerebral atrophy is noted.   3.  Atherosclerosis.      Based solely on CT findings, the brain injury guideline category is mBIG 1.      SDH < 4mm   IPH < 4mm   SAH < 3 sulci and < 1mm      The original BIG retrospective analysis found radiographic progression in 0% of BIG 1 patients and 2.6% BIG 2.      These findings were discussed with the patient's clinician, JULY DELGADO, on 8/24/2023 2:08 AM.             Assessment/Plan  * Trauma- (present on admission)  Assessment & Plan  Trauma surgery-primary service    Delirium- (present on admission)  Assessment & Plan  Avoid Benzodiazepines and Anticholinergics  Frequent orientation  Open window blinds during the day  Avoid naps during the day  Family at bedside whenever possible  Ensure adequate sleep at night - use Melatonin preferably  Avoid early morning labs  Avoid vital signs during sleep  Ambulate if possible  Provide adequate pain control  Monitor for urinary retention  Prevent and manage constipation  Discontinue IVs, Catheters, Tubes, Lines, Cardiac monitors, SCDs if possible  May need trial of Seroquel    Frequent falls- (present on admission)  Assessment & Plan  PT and OT evaluations  will need to check orthostatics    Frailty syndrome in geriatric  patient- (present on admission)  Assessment & Plan  PT/OT/SLP   Check Vitamin B12 and TSH  Hospice consulted by trauma surgery    Subdural hematoma, post-traumatic (HCC)- (present on admission)  Assessment & Plan  Neurochecks  Keppra for seizure prophylaxis  Neurosurgery following    Hearing loss- (present on admission)  Assessment & Plan  Hearing amplifier/head set    Urinary incontinence- (present on admission)  Assessment & Plan  Bladder scans  Monitor for urinary retention    CKD (chronic kidney disease) stage 3, GFR 30-59 ml/min (HCC)- (present on admission)  Assessment & Plan  Follow BMP    Facial laceration- (present on admission)  Assessment & Plan  Wound care    Depression- (present on admission)  Assessment & Plan  Remeron    Cognitive impairment- (present on admission)  Assessment & Plan  Check vitamin B12 and TSH    CAD (coronary artery disease)- (present on admission)  Assessment & Plan  Uncertain extent of disease  Metoprolol  Hold ASA  was not on statin    Longstanding persistent atrial fibrillation (HCC)- (present on admission)  Assessment & Plan  Metoprolol  Hold ASA  Not on anticoagulation secondary to frequent falls    Venous insufficiency of both lower extremities- (present on admission)  Assessment & Plan  Monitor    Acquired hypothyroidism- (present on admission)  Assessment & Plan  Synthroid, Cytomel  Check TSH    Essential hypertension- (present on admission)  Assessment & Plan  Metoprolol, Lisinopril         VTE prophylaxis: SCD

## 2023-08-26 NOTE — PROGRESS NOTES
Daughter at bedside, given Hospice referral info. Per hospice APRN, they do not need to be seen by hospice nurse to be on hospice. Family is already agreeable to hospice.  Messaged Ramírez CARDENAS, to follow up with family in AM about helping family transition into hospice.

## 2023-08-26 NOTE — PROGRESS NOTES
Trauma / Surgical Daily Progress Note    Date of Service  8/26/2023    Chief Complaint  89 y.o. female admitted 8/24/2023 with ICH after a fall.    Interval Events  Awake, alert, eating breakfast independently.  Case management following for hospice placement.   No acute events overnight.    Review of Systems  Review of Systems   Constitutional: Negative.    Eyes: Negative.  Negative for blurred vision and double vision.   Respiratory: Negative.     Cardiovascular: Negative.    Gastrointestinal: Negative.    Genitourinary: Negative.    Musculoskeletal: Negative.    Neurological:  Positive for headaches. Negative for dizziness and focal weakness.   Psychiatric/Behavioral: Negative.     All other systems reviewed and are negative.       Vital Signs  Temp:  [36.4 °C (97.5 °F)-37.3 °C (99.1 °F)] 36.4 °C (97.5 °F)  Pulse:  [63-67] 63  Resp:  [16] 16  BP: (140-157)/(64-85) 146/64  SpO2:  [96 %-97 %] 97 %    Physical Exam  Physical Exam  Vitals and nursing note reviewed.   Constitutional:       General: She is awake. She is not in acute distress.     Appearance: Normal appearance.   HENT:      Head: Normocephalic and atraumatic.      Comments: Laceration right orbit, well approximated.   Periorbital ecchymosis, contusion, right side.      Nose: Nose normal.      Mouth/Throat:      Mouth: Mucous membranes are moist.      Pharynx: Oropharynx is clear.   Eyes:      Extraocular Movements: Extraocular movements intact.      Conjunctiva/sclera: Conjunctivae normal.      Pupils: Pupils are equal, round, and reactive to light.   Cardiovascular:      Rate and Rhythm: Normal rate and regular rhythm.      Pulses: Normal pulses.   Pulmonary:      Effort: Pulmonary effort is normal. No respiratory distress.   Chest:      Chest wall: No swelling, tenderness or crepitus.   Abdominal:      General: Abdomen is flat. Bowel sounds are normal.      Palpations: Abdomen is soft.   Musculoskeletal:         General: Normal range of motion.       Cervical back: Full passive range of motion without pain, normal range of motion and neck supple.   Skin:     General: Skin is warm and dry.      Capillary Refill: Capillary refill takes less than 2 seconds.      Comments: Avulsion left arm well approximated with steri strips.   Moves all extremities.    Neurological:      General: No focal deficit present.      Mental Status: She is alert and oriented to person, place, and time. Mental status is at baseline.      GCS: GCS eye subscore is 4. GCS verbal subscore is 5. GCS motor subscore is 6.      Sensory: Sensation is intact.      Motor: Motor function is intact.   Psychiatric:         Mood and Affect: Mood normal.         Laboratory  No results found for this or any previous visit (from the past 24 hour(s)).      Fluids    Intake/Output Summary (Last 24 hours) at 8/26/2023 0944  Last data filed at 8/26/2023 0900  Gross per 24 hour   Intake 480 ml   Output 400 ml   Net 80 ml       Core Measures & Quality Metrics  Radiology images reviewed, Labs reviewed and Medications reviewed  Vargas catheter: No Vargas      DVT Prophylaxis: Contraindicated - High bleeding risk    Ulcer prophylaxis: Not indicated    Assessed for rehab: Patient was assess for and/or received rehabilitation services during this hospitalization    RAP Score Total: 7    CAGE Results: not completed Blood Alcohol>0.08: no       Assessment/Plan  * Trauma- (present on admission)  Assessment & Plan  Ground level fall.   Non Trauma Activation.  Sita Eddy MD. Trauma Surgery.    Subdural hematoma, post-traumatic (HCC)- (present on admission)  Assessment & Plan  2.6 mm right parietal subdural hematoma.  Non-operative management.   Interval head CT stable showing decrease size in subdural.  Post traumatic pharmacologic seizure prophylaxis for 1 week.  Speech Language Pathology cognitive evaluation.  Alicia Pepe MD. Neurosurgeon. [facility].    Encounter for geriatric assessment-  (present on admission)  Assessment & Plan  8/24 Geriatric medicine consulted, formal consult pending.  Dr. Dontrell Kessler.    Avulsion of arm, left, initial encounter- (present on admission)  Assessment & Plan  Wound care per emergency department.     Facial laceration- (present on admission)  Assessment & Plan  Laceration over right eye brow.   Suture closure in ED.     Frailty syndrome in geriatric patient- (present on admission)  Assessment & Plan  Underweight and fully functionally dependent.      Contraindication to deep vein thrombosis (DVT) prophylaxis- (present on admission)  Assessment & Plan  VTE prophylaxis initially contraindicated secondary to elevated bleeding risk.    Antiplatelet or antithrombotic long-term use- (present on admission)  Assessment & Plan  Daily aspirin use.   Admission TEG AA 37%, no platelets indicated.     Cognitive impairment- (present on admission)  Assessment & Plan  Baseline. Unable to confirm medical history or medications.   Formal cognitive evaluation pending.     CAD (coronary artery disease)- (present on admission)  Assessment & Plan  Chronic condition treated with aspirin.  Holding maintenance medication during acute traumatic illness.    Longstanding persistent atrial fibrillation (HCC)- (present on admission)  Assessment & Plan  Chronic condition treated with metoprolol and aspirin.  Systemic anticoagulation held on admission.  Resume metoprolol on admission.    Acquired hypothyroidism- (present on admission)  Assessment & Plan  Chronic condition treated with levothyroxine and liothyronine.  Resumed maintenance medication on admission.    Essential hypertension- (present on admission)  Assessment & Plan  Chronic condition treated with lisinopril.  Resumed maintenance medication on admission.        Discussed patient condition with RN, Patient, and trauma surgery .

## 2023-08-26 NOTE — CARE PLAN
The patient is Stable - Low risk of patient condition declining or worsening    Shift Goals  Clinical Goals: Safety  Patient Goals: Go home, rest  Family Goals: Comfort    Progress made toward(s) clinical / shift goals:      Problem: Fall Risk  Goal: Patient will remain free from falls  Outcome: Progressing        Problem: Skin Integrity  Goal: Skin integrity is maintained or improved  Outcome: Progressing         Patient is not progressing towards the following goals:

## 2023-08-27 PROCEDURE — 99232 SBSQ HOSP IP/OBS MODERATE 35: CPT | Performed by: FAMILY MEDICINE

## 2023-08-27 PROCEDURE — 770001 HCHG ROOM/CARE - MED/SURG/GYN PRIV*

## 2023-08-27 PROCEDURE — 99231 SBSQ HOSP IP/OBS SF/LOW 25: CPT | Performed by: REGISTERED NURSE

## 2023-08-27 PROCEDURE — A9270 NON-COVERED ITEM OR SERVICE: HCPCS

## 2023-08-27 PROCEDURE — 700102 HCHG RX REV CODE 250 W/ 637 OVERRIDE(OP)

## 2023-08-27 RX ADMIN — LEVETIRACETAM 500 MG: 500 TABLET, FILM COATED ORAL at 16:28

## 2023-08-27 RX ADMIN — POLYETHYLENE GLYCOL 3350 1 PACKET: 17 POWDER, FOR SOLUTION ORAL at 16:29

## 2023-08-27 RX ADMIN — LEVETIRACETAM 500 MG: 500 TABLET, FILM COATED ORAL at 05:38

## 2023-08-27 RX ADMIN — MAGNESIUM HYDROXIDE 30 ML: 400 SUSPENSION ORAL at 05:38

## 2023-08-27 RX ADMIN — LISINOPRIL 10 MG: 10 TABLET ORAL at 05:38

## 2023-08-27 RX ADMIN — DOCUSATE SODIUM 100 MG: 100 CAPSULE, LIQUID FILLED ORAL at 05:38

## 2023-08-27 RX ADMIN — POLYETHYLENE GLYCOL 3350 1 PACKET: 17 POWDER, FOR SOLUTION ORAL at 05:38

## 2023-08-27 RX ADMIN — MIRTAZAPINE 7.5 MG: 15 TABLET, FILM COATED ORAL at 20:27

## 2023-08-27 RX ADMIN — LEVOTHYROXINE SODIUM 112 MCG: 0.11 TABLET ORAL at 06:00

## 2023-08-27 RX ADMIN — SENNOSIDES AND DOCUSATE SODIUM 1 TABLET: 50; 8.6 TABLET ORAL at 20:28

## 2023-08-27 RX ADMIN — METOPROLOL TARTRATE 50 MG: 25 TABLET, FILM COATED ORAL at 05:39

## 2023-08-27 RX ADMIN — DOCUSATE SODIUM 100 MG: 100 CAPSULE, LIQUID FILLED ORAL at 16:28

## 2023-08-27 RX ADMIN — ACETAMINOPHEN 650 MG: 325 TABLET, FILM COATED ORAL at 16:28

## 2023-08-27 RX ADMIN — LIOTHYRONINE SODIUM 5 MCG: 5 TABLET ORAL at 05:39

## 2023-08-27 RX ADMIN — ACETAMINOPHEN 650 MG: 325 TABLET, FILM COATED ORAL at 05:39

## 2023-08-27 ASSESSMENT — ENCOUNTER SYMPTOMS
DOUBLE VISION: 0
PSYCHIATRIC NEGATIVE: 1
FOCAL WEAKNESS: 0
EYES NEGATIVE: 1
CONSTITUTIONAL NEGATIVE: 1
SPEECH CHANGE: 0
DIZZINESS: 0
HEADACHES: 0
MUSCULOSKELETAL NEGATIVE: 1
BLURRED VISION: 0
CARDIOVASCULAR NEGATIVE: 1
GASTROINTESTINAL NEGATIVE: 1
RESPIRATORY NEGATIVE: 1

## 2023-08-27 ASSESSMENT — PAIN DESCRIPTION - PAIN TYPE
TYPE: ACUTE PAIN
TYPE: ACUTE PAIN

## 2023-08-27 NOTE — CARE PLAN
The patient is Stable - Low risk of patient condition declining or worsening    Shift Goals  Clinical Goals: safety and comfort  Patient Goals: comfort  Family Goals: norberto    Progress made toward(s) clinical / shift goals:    Problem: Knowledge Deficit - Standard  Goal: Patient and family/care givers will demonstrate understanding of plan of care, disease process/condition, diagnostic tests and medications  Outcome: Progressing     Problem: Skin Integrity  Goal: Skin integrity is maintained or improved  Outcome: Progressing     Problem: Fall Risk  Goal: Patient will remain free from falls  Outcome: Progressing     Problem: Neuro Status  Goal: Neuro status will remain stable or improve  Outcome: Progressing       Patient is not progressing towards the following goals:

## 2023-08-27 NOTE — DISCHARGE PLANNING
CM called patient's daughter, Shania (H-#[phone], dorian l#[phone]) to obtain Hospice choices; No answer, left voice message stating that CM will callback.

## 2023-08-27 NOTE — PROGRESS NOTES
Trauma / Surgical Daily Progress Note    Date of Service  8/27/2023    Chief Complaint  89 y.o. female admitted 8/24/2023 with ICH after a fall.    Interval Events  Patient sleeping, awakes to voice.  No complaints.  Disposition pending hospice,  following.     Review of Systems  Review of Systems   Constitutional: Negative.    Eyes: Negative.  Negative for blurred vision and double vision.   Respiratory: Negative.     Cardiovascular: Negative.    Gastrointestinal: Negative.    Genitourinary: Negative.    Musculoskeletal: Negative.    Neurological:  Negative for dizziness, speech change, focal weakness and headaches.   Psychiatric/Behavioral: Negative.     All other systems reviewed and are negative.       Vital Signs  Temp:  [36.3 °C (97.3 °F)-37 °C (98.6 °F)] 37 °C (98.6 °F)  Pulse:  [52-62] 52  Resp:  [16-17] 17  BP: (127-152)/(57-66) 150/66  SpO2:  [96 %] 96 %    Physical Exam  Physical Exam  Vitals and nursing note reviewed.   Constitutional:       General: She is awake. She is not in acute distress.     Appearance: Normal appearance.   HENT:      Head: Normocephalic and atraumatic.      Comments: Laceration right orbit, well approximated.   Periorbital ecchymosis, contusion, right side.      Nose: Nose normal.      Mouth/Throat:      Mouth: Mucous membranes are moist.      Pharynx: Oropharynx is clear.   Eyes:      Extraocular Movements: Extraocular movements intact.      Conjunctiva/sclera: Conjunctivae normal.      Pupils: Pupils are equal, round, and reactive to light.   Cardiovascular:      Rate and Rhythm: Normal rate and regular rhythm.      Pulses: Normal pulses.   Pulmonary:      Effort: Pulmonary effort is normal. No respiratory distress.   Chest:      Chest wall: No swelling, tenderness or crepitus.   Abdominal:      General: Abdomen is flat. Bowel sounds are normal.      Palpations: Abdomen is soft.   Musculoskeletal:         General: Normal range of motion.      Cervical back: Full  passive range of motion without pain, normal range of motion and neck supple.   Skin:     General: Skin is warm and dry.      Capillary Refill: Capillary refill takes less than 2 seconds.      Comments: Avulsion left arm well approximated with steri strips.   Moves all extremities.    Neurological:      General: No focal deficit present.      Mental Status: She is alert and oriented to person, place, and time. Mental status is at baseline.      GCS: GCS eye subscore is 4. GCS verbal subscore is 5. GCS motor subscore is 6.      Sensory: Sensation is intact.      Motor: Motor function is intact.   Psychiatric:         Mood and Affect: Mood normal.         Laboratory  No results found for this or any previous visit (from the past 24 hour(s)).      Fluids  No intake or output data in the 24 hours ending 08/27/23 1128      Core Measures & Quality Metrics  Radiology images reviewed, Labs reviewed and Medications reviewed  Vargas catheter: No Vargas      DVT Prophylaxis: Contraindicated - High bleeding risk    Ulcer prophylaxis: Not indicated    Assessed for rehab: Patient was assess for and/or received rehabilitation services during this hospitalization    RAP Score Total: 9    CAGE Results: not completed Blood Alcohol>0.08: no       Assessment/Plan  * Trauma- (present on admission)  Assessment & Plan  Ground level fall.   Non Trauma Activation.  Sita Eddy MD. Trauma Surgery.    Subdural hematoma, post-traumatic (HCC)- (present on admission)  Assessment & Plan  2.6 mm right parietal subdural hematoma.  Non-operative management.   Interval head CT stable showing decrease size in subdural.  Post traumatic pharmacologic seizure prophylaxis for 1 week.  Speech Language Pathology cognitive evaluation.  Alicia Pepe MD. Neurosurgeon. [facility].    Encounter for geriatric assessment- (present on admission)  Assessment & Plan  8/24 Geriatric medicine consulted, formal consult pending.  Dr. Jon  Victoria.    Avulsion of arm, left, initial encounter- (present on admission)  Assessment & Plan  Wound care per emergency department.     Facial laceration- (present on admission)  Assessment & Plan  Laceration over right eye brow.   Suture closure in ED.     Frailty syndrome in geriatric patient- (present on admission)  Assessment & Plan  Underweight and fully functionally dependent.      Contraindication to deep vein thrombosis (DVT) prophylaxis- (present on admission)  Assessment & Plan  VTE prophylaxis initially contraindicated secondary to elevated bleeding risk.    Antiplatelet or antithrombotic long-term use- (present on admission)  Assessment & Plan  Daily aspirin use.   Admission TEG AA 37%, no platelets indicated.     Cognitive impairment- (present on admission)  Assessment & Plan  Baseline. Unable to confirm medical history or medications.   Formal cognitive evaluation pending.     CAD (coronary artery disease)- (present on admission)  Assessment & Plan  Chronic condition treated with aspirin.  Holding maintenance medication during acute traumatic illness.    Longstanding persistent atrial fibrillation (HCC)- (present on admission)  Assessment & Plan  Chronic condition treated with metoprolol and aspirin.  Systemic anticoagulation held on admission.  Resume metoprolol on admission.    Acquired hypothyroidism- (present on admission)  Assessment & Plan  Chronic condition treated with levothyroxine and liothyronine.  Resumed maintenance medication on admission.    Essential hypertension- (present on admission)  Assessment & Plan  Chronic condition treated with lisinopril.  Resumed maintenance medication on admission.        Discussed patient condition with RN, Patient, and trauma surgery .

## 2023-08-27 NOTE — CARE PLAN
The patient is Stable - Low risk of patient condition declining or worsening    Shift Goals  Clinical Goals: safety, comfort  Patient Goals: go home, rest  Family Goals: norberto    Progress made toward(s) clinical / shift goals:    Problem: Knowledge Deficit - Standard  Goal: Patient and family/care givers will demonstrate understanding of plan of care, disease process/condition, diagnostic tests and medications  Outcome: Progressing     Problem: Skin Integrity  Goal: Skin integrity is maintained or improved  Outcome: Progressing     Problem: Fall Risk  Goal: Patient will remain free from falls  Outcome: Progressing     Problem: Neuro Status  Goal: Neuro status will remain stable or improve  Outcome: Progressing     Problem: Urinary Elimination  Goal: Establish and maintain regular urinary output  Outcome: Progressing     Problem: Respiratory  Goal: Patient will achieve/maintain optimum respiratory ventilation and gas exchange  Outcome: Progressing       Patient is not progressing towards the following goals:

## 2023-08-27 NOTE — CARE PLAN
The patient is Stable - Low risk of patient condition declining or worsening    Shift Goals  Clinical Goals: Safety  Patient Goals: Comfort  Family Goals: GIGI    Progress made toward(s) clinical / shift goals:    Problem: Skin Integrity  Goal: Skin integrity is maintained or improved  Outcome: Progressing     Problem: Fall Risk  Goal: Patient will remain free from falls  Outcome: Progressing     Problem: Neuro Status  Goal: Neuro status will remain stable or improve  Outcome: Progressing     Problem: Risk for Aspiration  Goal: Patient's risk for aspiration will be absent or decrease  Outcome: Progressing     Problem: Mobility  Goal: Patient's capacity to carry out activities will improve  Outcome: Progressing       Patient is not progressing towards the following goals:

## 2023-08-27 NOTE — PROGRESS NOTES
Geriatric Medicine Daily Progress Note      Date of Service  8/27/2023    Chief Complaint  89 y.o. female admitted 8/24/2023 with fall    Hospital Course  Admitted  with small subdural hematoma, right facial laceration, after a fall.  Unable to obtain any history from the patient.  History is taken from the patient's daughter who is at bedside.  Patient with known history of cognitive impairment, frequent falls, vision and hearing impairment, urinary incontinence, dependent on ADLs and IADLs, and resides in an assisted living facility.  Patient supposedly has a walker and wheelchair but refuses to use them.  She also has known hearing loss, but refuses to wear hearing aids.  Daughter states that they have diagnosed her with mild cognitive impairment, but she feels that her memory is much worse than the labeled diagnosis.  Daughter states that she can easily get confused, and frequently forgets conversations while they are occurring.  She has a POLST, and has healthcare and financial POA.    Interval Problem Update  Drowsy opens eyes to voice    Updated given and plan of care discussed with patient's family who were at bedside.    Consultants/Specialty  Trauma surgery  Neurosurgery    Code Status  DNR/DNI    Disposition  postacute placement versus hospice    Review of Systems  Review of Systems   Unable to perform ROS: Medical condition        Physical Exam  Temp:  [36.3 °C (97.3 °F)-37 °C (98.6 °F)] 37 °C (98.6 °F)  Pulse:  [52-62] 52  Resp:  [16-17] 17  BP: (127-152)/(57-66) 150/66  SpO2:  [96 %] 96 %    Physical Exam  Vitals and nursing note reviewed.   Constitutional:       Comments: drowsy   HENT:      Head: Normocephalic.      Comments: Laceration at right supraorbital area with stitches  Right facial ecchymosis     Nose: No congestion.      Mouth/Throat:      Mouth: Mucous membranes are moist.   Eyes:      Conjunctiva/sclera: Conjunctivae normal.   Cardiovascular:      Rate and Rhythm: Normal rate. Rhythm  irregular.      Heart sounds: Murmur heard.   Pulmonary:      Effort: Pulmonary effort is normal.      Breath sounds: Normal breath sounds.   Abdominal:      General: There is no distension.      Tenderness: There is no abdominal tenderness. There is no guarding or rebound.   Musculoskeletal:      Cervical back: No tenderness.   Skin:     Comments: Skin tear to left forearm   Neurological:      Comments: drowsy         CAM  Acute onset and fluctuating course   Yes  Inattention                                         No   Disorganized thinking                        Yes  Altered level of consciousness          Yes    Medication Review    Yes    Laboratory                            Imaging  CT-HEAD W/O   Final Result      1.  Trace RIGHT frontoparietal subdural blood, decreased in size and conspicuity since the prior study   2.  No new hemorrhage   3.  Atrophy   4.  White matter changes         DX-SHOULDER 2+ RIGHT   Final Result         1.  No acute traumatic bony injury.         DX-FOREARM LEFT   Final Result         1.  No acute traumatic bony injury.      DX-HAND 3+ LEFT   Final Result         1.  No acute traumatic bony injury.      CT-HEAD W/O   Final Result         1.  2.6 mm right parietal subdural hematoma   2.  Nonspecific white matter changes, commonly associated with small vessel ischemic disease.  Associated mild cerebral atrophy is noted.   3.  Atherosclerosis.      Based solely on CT findings, the brain injury guideline category is mBIG 1.      SDH < 4mm   IPH < 4mm   SAH < 3 sulci and < 1mm      The original BIG retrospective analysis found radiographic progression in 0% of BIG 1 patients and 2.6% BIG 2.      These findings were discussed with the patient's clinician, JULY DELGADO, on 8/24/2023 2:08 AM.             Assessment/Plan  * Trauma- (present on admission)  Assessment & Plan  Trauma surgery-primary service    Delirium- (present on admission)  Assessment & Plan  Avoid Benzodiazepines  and Anticholinergics  Frequent orientation  Open window blinds during the day  Avoid naps during the day  Family at bedside whenever possible  Ensure adequate sleep at night - use Melatonin preferably  Avoid early morning labs  Avoid vital signs during sleep  Ambulate if possible  Provide adequate pain control  Monitor for urinary retention  Prevent and manage constipation  Discontinue IVs, Catheters, Tubes, Lines, Cardiac monitors, SCDs if possible  May need trial of Seroquel    Frequent falls- (present on admission)  Assessment & Plan  Hospice consult    Frailty syndrome in geriatric patient- (present on admission)  Assessment & Plan  Hospice consulted by trauma surgery    Subdural hematoma, post-traumatic (HCC)- (present on admission)  Assessment & Plan  Neurochecks  Keppra for seizure prophylaxis  Neurosurgery following    Hearing loss- (present on admission)  Assessment & Plan  Hearing amplifier/head set    Urinary incontinence- (present on admission)  Assessment & Plan  Bladder scans  Monitor for urinary retention    CKD (chronic kidney disease) stage 3, GFR 30-59 ml/min ([facility])- (present on admission)  Assessment & Plan  Follow BMP    Facial laceration- (present on admission)  Assessment & Plan  Wound care    Depression- (present on admission)  Assessment & Plan  Remeron    Cognitive impairment- (present on admission)  Assessment & Plan  Hospice consulted    CAD (coronary artery disease)- (present on admission)  Assessment & Plan  Uncertain extent of disease  Metoprolol  Hold ASA  was not on statin    Longstanding persistent atrial fibrillation (HCC)- (present on admission)  Assessment & Plan  Metoprolol  Hold ASA  Not on anticoagulation secondary to frequent falls    Venous insufficiency of both lower extremities- (present on admission)  Assessment & Plan  Monitor    Acquired hypothyroidism- (present on admission)  Assessment & Plan  Synthroid, Cytomel    Essential hypertension- (present on  admission)  Assessment & Plan  Metoprolol, Lisinopril         VTE prophylaxis: SCD

## 2023-08-28 PROBLEM — R54 FRAILTY SYNDROME IN GERIATRIC PATIENT: Status: RESOLVED | Noted: 2023-08-24 | Resolved: 2023-08-28

## 2023-08-28 PROBLEM — Z51.5 HOSPICE CARE: Status: ACTIVE | Noted: 2023-08-28

## 2023-08-28 PROCEDURE — 99232 SBSQ HOSP IP/OBS MODERATE 35: CPT | Performed by: FAMILY MEDICINE

## 2023-08-28 PROCEDURE — 770001 HCHG ROOM/CARE - MED/SURG/GYN PRIV*

## 2023-08-28 PROCEDURE — 99231 SBSQ HOSP IP/OBS SF/LOW 25: CPT | Performed by: REGISTERED NURSE

## 2023-08-28 PROCEDURE — 700102 HCHG RX REV CODE 250 W/ 637 OVERRIDE(OP)

## 2023-08-28 PROCEDURE — A9270 NON-COVERED ITEM OR SERVICE: HCPCS

## 2023-08-28 RX ADMIN — LEVOTHYROXINE SODIUM 112 MCG: 0.11 TABLET ORAL at 06:22

## 2023-08-28 RX ADMIN — MIRTAZAPINE 7.5 MG: 15 TABLET, FILM COATED ORAL at 20:51

## 2023-08-28 RX ADMIN — ACETAMINOPHEN 650 MG: 325 TABLET, FILM COATED ORAL at 06:23

## 2023-08-28 RX ADMIN — LEVETIRACETAM 500 MG: 500 TABLET, FILM COATED ORAL at 17:15

## 2023-08-28 RX ADMIN — LIOTHYRONINE SODIUM 5 MCG: 5 TABLET ORAL at 06:22

## 2023-08-28 RX ADMIN — ACETAMINOPHEN 650 MG: 325 TABLET, FILM COATED ORAL at 12:37

## 2023-08-28 RX ADMIN — DOCUSATE SODIUM 100 MG: 100 CAPSULE, LIQUID FILLED ORAL at 06:23

## 2023-08-28 RX ADMIN — LISINOPRIL 10 MG: 10 TABLET ORAL at 06:22

## 2023-08-28 RX ADMIN — SENNOSIDES AND DOCUSATE SODIUM 1 TABLET: 50; 8.6 TABLET ORAL at 20:51

## 2023-08-28 RX ADMIN — LEVETIRACETAM 500 MG: 500 TABLET, FILM COATED ORAL at 06:21

## 2023-08-28 RX ADMIN — DOCUSATE SODIUM 100 MG: 100 CAPSULE, LIQUID FILLED ORAL at 17:15

## 2023-08-28 RX ADMIN — ACETAMINOPHEN 650 MG: 325 TABLET, FILM COATED ORAL at 17:15

## 2023-08-28 RX ADMIN — POLYETHYLENE GLYCOL 3350 1 PACKET: 17 POWDER, FOR SOLUTION ORAL at 17:15

## 2023-08-28 ASSESSMENT — ENCOUNTER SYMPTOMS
FLANK PAIN: 0
SHORTNESS OF BREATH: 0
FOCAL WEAKNESS: 0
HEARTBURN: 0
SPEECH CHANGE: 0
PSYCHIATRIC NEGATIVE: 1
NECK PAIN: 0
DIZZINESS: 0
DOUBLE VISION: 0
DIARRHEA: 0
MUSCULOSKELETAL NEGATIVE: 1
VOMITING: 0
GASTROINTESTINAL NEGATIVE: 1
EYES NEGATIVE: 1
BLURRED VISION: 0
ABDOMINAL PAIN: 0
RESPIRATORY NEGATIVE: 1
COUGH: 0
CONSTITUTIONAL NEGATIVE: 1
CHILLS: 0
DIAPHORESIS: 0
WEAKNESS: 1
SENSORY CHANGE: 0
WHEEZING: 0
HEADACHES: 0
PALPITATIONS: 0
MYALGIAS: 0
CARDIOVASCULAR NEGATIVE: 1
NAUSEA: 0
NERVOUS/ANXIOUS: 0
SORE THROAT: 0
BACK PAIN: 0
FEVER: 0

## 2023-08-28 ASSESSMENT — COGNITIVE AND FUNCTIONAL STATUS - GENERAL
CLIMB 3 TO 5 STEPS WITH RAILING: A LOT
MOVING FROM LYING ON BACK TO SITTING ON SIDE OF FLAT BED: A LOT
HELP NEEDED FOR BATHING: A LOT
EATING MEALS: A LITTLE
DRESSING REGULAR UPPER BODY CLOTHING: A LITTLE
PERSONAL GROOMING: A LITTLE
TOILETING: A LOT
MOVING TO AND FROM BED TO CHAIR: A LOT
SUGGESTED CMS G CODE MODIFIER DAILY ACTIVITY: CK
STANDING UP FROM CHAIR USING ARMS: A LOT
WALKING IN HOSPITAL ROOM: A LOT
SUGGESTED CMS G CODE MODIFIER MOBILITY: CL
DRESSING REGULAR LOWER BODY CLOTHING: A LOT
MOBILITY SCORE: 13
DAILY ACTIVITIY SCORE: 15
TURNING FROM BACK TO SIDE WHILE IN FLAT BAD: A LITTLE

## 2023-08-28 ASSESSMENT — PAIN DESCRIPTION - PAIN TYPE
TYPE: ACUTE PAIN

## 2023-08-28 NOTE — DISCHARGE PLANNING
T/C placed to patient's daughter, Shania (H-#[phone]) to obtain Hospice choices; Shania selected [facility] and stated that administration @ [facility] is agreeable to patient returning on hospice care.    Hospice choice form faxed to [facility].      **1510 Hrs - Per Nasrin (#[phone]) @ [facility], hospice referral accepted however, patient cannot be admitted until Wednesday 8/30/2023.   Nasrin will update CM on transport time.   CM updated Trauma NP/Viktoriya and bedside nurse/Sendy on DCP.      **1538 Hrs - Per Nasrin @ [facility], DME to be delivered to [facility] tomorrow & [facility] transport scheduled for 10 AM on Wednesday 8/30/2023.

## 2023-08-28 NOTE — ASSESSMENT & PLAN NOTE
Hospice referral sent to [facility].  No labs, IV fluids, or enteral feeds per POLST. DNR/DNI.

## 2023-08-28 NOTE — DISCHARGE SUMMARY
Trauma Discharge Summary    DATE OF ADMISSION: 8/24/2023    DATE OF DISCHARGE: 8/30/2023    LENGTH OF STAY: 6 days    ATTENDING PHYSICIAN: Sita Eddy M.D.    CONSULTING PHYSICIAN:   1. Dontrell Kessler M.D. Geriatric Medicine  2. Alicia Pepe MD. Neurosurgery    DISCHARGE DIAGNOSIS:  Principal Problem:    Trauma  Active Problems:    Subdural hematoma, post-traumatic (HCC)    Hospice care    Antiplatelet or antithrombotic long-term use    Contraindication to deep vein thrombosis (DVT) prophylaxis    Facial laceration    Avulsion of arm, left, initial encounter    Encounter for geriatric assessment    Venous insufficiency of both lower extremities    Depression    Frequent falls    CKD (chronic kidney disease) stage 3, GFR 30-59 ml/min (HCC)    Urinary incontinence    Delirium    Hearing loss    Essential hypertension    Acquired hypothyroidism    Longstanding persistent atrial fibrillation ([facility])    CAD (coronary artery disease)    Cognitive impairment  Resolved Problems:    Frailty syndrome in geriatric patient      PROCEDURES:  1. None    HISTORY OF PRESENT ILLNESS: The patient is a 89 y.o. female who was reportedly injured in a mechanical fall at living facility. The patient was transferred to [facility] in [place], [place].    HOSPITAL COURSE: The patient was triaged as a trauma consult activation.  The patient sustained a subdural hematoma in setting of chronic antiplatelet use.  Neurosurgery was consulted.  The patient was transported to trauma boucher.  A TEG was completed did not show inhibition of AA and no platelets were indicated.  Neurosurgery recommended nonoperative management with seizure prophylaxis.  Patient did have a POLST in place stating she is a DNR DNI and does not want any IV fluids or enteral feeds.  Hospice referral was placed on admission.  Total hospital course was relatively uncomplicated.  Per POLST,comfort directed care was utilized.  Case management assisted in  discharge disposition and was made communicated with family member.  Ultimately was discharged to hospice.      HOSPITAL PROBLEM LIST:  * Trauma- (present on admission)  Assessment & Plan  Ground level fall.   Non Trauma Activation.  Sita Eddy MD. Trauma Surgery.    Hospice care  Assessment & Plan  Hospice referral sent to [facility].  No labs, IV fluids, or enteral feeds per POLST. DNR/DNI.    Subdural hematoma, post-traumatic (HCC)- (present on admission)  Assessment & Plan  2.6 mm right parietal subdural hematoma.  Non-operative management.   Interval head CT stable showing decrease size in subdural.  Post traumatic pharmacologic seizure prophylaxis for 1 week.  Speech Language Pathology cognitive evaluation.  Alicia Pepe MD. Neurosurgeon. [facility].    Encounter for geriatric assessment- (present on admission)  Assessment & Plan  8/24 Geriatric medicine following.  Dr. Dontrell Kessler.    Avulsion of arm, left, initial encounter- (present on admission)  Assessment & Plan  Wound care per emergency department.     Facial laceration- (present on admission)  Assessment & Plan  Laceration over right eye brow.   Suture closure in ED.     Contraindication to deep vein thrombosis (DVT) prophylaxis- (present on admission)  Assessment & Plan  VTE prophylaxis initially contraindicated secondary to elevated bleeding risk.    Antiplatelet or antithrombotic long-term use- (present on admission)  Assessment & Plan  Daily aspirin use.   Admission TEG AA 37%, no platelets indicated.     Frailty syndrome in geriatric patient-resolved as of 8/28/2023, (present on admission)  Assessment & Plan  Underweight and fully functionally dependent.      Cognitive impairment- (present on admission)  Assessment & Plan  Baseline. Unable to confirm medical history or medications.   Formal cognitive evaluation pending.     CAD (coronary artery disease)- (present on admission)  Assessment & Plan  Chronic condition  treated with aspirin.  Holding maintenance medication during acute traumatic illness.    Longstanding persistent atrial fibrillation (HCC)- (present on admission)  Assessment & Plan  Chronic condition treated with metoprolol and aspirin.  Systemic anticoagulation held on admission.  Resume metoprolol on admission.    Acquired hypothyroidism- (present on admission)  Assessment & Plan  Chronic condition treated with levothyroxine and liothyronine.  Resumed maintenance medication on admission.    Essential hypertension- (present on admission)  Assessment & Plan  Chronic condition treated with lisinopril.  Resumed maintenance medication on admission.          DISPOSITION: Discharged to [facility] with [facility] on 8/30/2023. The patient and family were counseled and questions were answered. Specifically, signs and symptoms of infection, respiratory decompensation and persistent or worsening pain were discussed and the patient agrees to seek medical attention if any of these develop.    DISCHARGE MEDICATIONS:  The patients controlled substance history was reviewed and a controlled substance use informed consent (if applicable) was provided by [facility] and the patient has been prescribed.     Medication List        STOP taking these medications      amoxicillin-clavulanate 500-125 MG Tabs  Commonly known as: Augmentin     aspirin EC 81 MG Tbec  Commonly known as: Ecotrin     CALCIUM + D PO     ciprofloxacin 250 MG Tabs  Commonly known as: Cipro     D-MANNOSE PO     DALYVITE PO     erythromycin 5 MG/GM Oint     Florastor 250 MG Caps  Generic drug: saccharomyces boulardii     ibuprofen 200 MG Tabs  Commonly known as: Motrin     levothyroxine 112 MCG Tabs  Commonly known as: Synthroid     liothyronine 5 MCG Tabs  Commonly known as: Cytomel     lisinopril 10 MG Tabs  Commonly known as: Prinivil     Lubricating Eye Drops 0.4-0.3 % Soln  Generic drug: polyethyl glycol-propyl glycol      metoprolol tartrate 50 MG Tabs  Commonly known as: Lopressor     mirtazapine 7.5 MG tablet  Commonly known as: Remeron     sennosides-docusate sodium 8.6-50 MG tablet  Commonly known as: Senokot-S              ACTIVITY:  As tolerated.    WOUND CARE:  Remove sutures on 9/1/23    DIET:  Orders Placed This Encounter   Procedures    Diet Order Diet: Regular     Standing Status:   Standing     Number of Occurrences:   1     Order Specific Question:   Diet:     Answer:   Regular [1]       FOLLOW UP:  Alicia Pepe M.D.  [address] [facility] [id number] [phone]    Follow up  As needed    [facility]  [address] [facility] [id number] [phone]    Follow up  Wound check.    JONATHAN  [address] [place] [address] [address]  [facility] [id number] [phone]          TIME SPENT ON DISCHARGE: 35 minutes      ____________________________________________  Flory Foy D.N.P.    DD: 8/30/2023 8:34 AM

## 2023-08-28 NOTE — PROGRESS NOTES
Trauma / Surgical Daily Progress Note    Date of Service  8/28/2023    Chief Complaint  89 y.o. female admitted 8/24/2023 with ICH after a fall.    Interval Events  Patient eating breakfast, no complaints.  Referral sent to [facility].  No acute events overnight.    Review of Systems  Review of Systems   Constitutional: Negative.    Eyes: Negative.  Negative for blurred vision and double vision.   Respiratory: Negative.     Cardiovascular: Negative.    Gastrointestinal: Negative.    Genitourinary: Negative.    Musculoskeletal: Negative.    Neurological:  Negative for dizziness, speech change, focal weakness and headaches.   Psychiatric/Behavioral: Negative.     All other systems reviewed and are negative.       Vital Signs  Temp:  [36.9 °C (98.4 °F)-37.3 °C (99.1 °F)] 37 °C (98.6 °F)  Pulse:  [55-66] 66  Resp:  [16-17] 16  BP: (138-148)/(57-79) 142/75  SpO2:  [95 %-97 %] 96 %    Physical Exam  Physical Exam  Vitals and nursing note reviewed.   Constitutional:       General: She is awake. She is not in acute distress.  HENT:      Head: Normocephalic and atraumatic.      Comments: Laceration right orbit, well approximated.   Periorbital ecchymosis, contusion, right side.      Nose: Nose normal.      Mouth/Throat:      Mouth: Mucous membranes are moist.      Pharynx: Oropharynx is clear.   Eyes:      Extraocular Movements: Extraocular movements intact.      Conjunctiva/sclera:      Right eye: Right conjunctiva is injected.      Left eye: Left conjunctiva is injected.      Pupils: Pupils are equal, round, and reactive to light.   Cardiovascular:      Rate and Rhythm: Normal rate.      Pulses: Normal pulses.   Pulmonary:      Effort: Pulmonary effort is normal. No respiratory distress.   Abdominal:      General: Abdomen is flat. Bowel sounds are normal.      Palpations: Abdomen is soft.   Musculoskeletal:         General: Normal range of motion.      Cervical back: Full passive range of motion without pain, normal  range of motion and neck supple.   Skin:     General: Skin is warm and dry.      Capillary Refill: Capillary refill takes less than 2 seconds.      Comments: Avulsion left arm well approximated with steri strips.   Moves all extremities.    Neurological:      General: No focal deficit present.      Mental Status: She is alert and oriented to person, place, and time. Mental status is at baseline.      GCS: GCS eye subscore is 4. GCS verbal subscore is 5. GCS motor subscore is 6.      Sensory: Sensation is intact.      Motor: Motor function is intact.   Psychiatric:         Mood and Affect: Mood normal.       Laboratory  No results found for this or any previous visit (from the past 24 hour(s)).      Fluids  No intake or output data in the 24 hours ending 08/28/23 1055      Core Measures & Quality Metrics  Radiology images reviewed, Labs reviewed and Medications reviewed  Vargas catheter: No Vargas      DVT Prophylaxis: Contraindicated - High bleeding risk    Ulcer prophylaxis: Not indicated    Assessed for rehab: Patient was assess for and/or received rehabilitation services during this hospitalization    RAP Score Total: 9    CAGE Results: not completed Blood Alcohol>0.08: no       Assessment/Plan  * Trauma- (present on admission)  Assessment & Plan  Ground level fall.   Non Trauma Activation.  Sita Eddy MD. Trauma Surgery.    Subdural hematoma, post-traumatic (HCC)- (present on admission)  Assessment & Plan  2.6 mm right parietal subdural hematoma.  Non-operative management.   Interval head CT stable showing decrease size in subdural.  Post traumatic pharmacologic seizure prophylaxis for 1 week.  Speech Language Pathology cognitive evaluation.  Alicia Pepe MD. Neurosurgeon. [facility].    Encounter for geriatric assessment- (present on admission)  Assessment & Plan  8/24 Geriatric medicine following.  Dr. Dontrell Kessler.    Avulsion of arm, left, initial encounter- (present on  admission)  Assessment & Plan  Wound care per emergency department.     Facial laceration- (present on admission)  Assessment & Plan  Laceration over right eye brow.   Suture closure in ED.     Frailty syndrome in geriatric patient- (present on admission)  Assessment & Plan  Underweight and fully functionally dependent.      Contraindication to deep vein thrombosis (DVT) prophylaxis- (present on admission)  Assessment & Plan  VTE prophylaxis initially contraindicated secondary to elevated bleeding risk.    Antiplatelet or antithrombotic long-term use- (present on admission)  Assessment & Plan  Daily aspirin use.   Admission TEG AA 37%, no platelets indicated.     Cognitive impairment- (present on admission)  Assessment & Plan  Baseline. Unable to confirm medical history or medications.   Formal cognitive evaluation pending.     CAD (coronary artery disease)- (present on admission)  Assessment & Plan  Chronic condition treated with aspirin.  Holding maintenance medication during acute traumatic illness.    Longstanding persistent atrial fibrillation (HCC)- (present on admission)  Assessment & Plan  Chronic condition treated with metoprolol and aspirin.  Systemic anticoagulation held on admission.  Resume metoprolol on admission.    Acquired hypothyroidism- (present on admission)  Assessment & Plan  Chronic condition treated with levothyroxine and liothyronine.  Resumed maintenance medication on admission.    Essential hypertension- (present on admission)  Assessment & Plan  Chronic condition treated with lisinopril.  Resumed maintenance medication on admission.        Discussed patient condition with RN, Patient, and trauma surgery .

## 2023-08-28 NOTE — PROGRESS NOTES
Geriatric Medicine Daily Progress Note      Date of Service  8/28/2023    Chief Complaint  89 y.o. female admitted 8/24/2023 with fall    Hospital Course  Admitted  with small subdural hematoma, right facial laceration, after a fall.  Unable to obtain any history from the patient.  History is taken from the patient's daughter who is at bedside.  Patient with known history of cognitive impairment, frequent falls, vision and hearing impairment, urinary incontinence, dependent on ADLs and IADLs, and resides in an assisted living facility.  Patient supposedly has a walker and wheelchair but refuses to use them.  She also has known hearing loss, but refuses to wear hearing aids.  Daughter states that they have diagnosed her with mild cognitive impairment, but she feels that her memory is much worse than the labeled diagnosis.  Daughter states that she can easily get confused, and frequently forgets conversations while they are occurring.  She has a POLST, and has healthcare and financial POA.    Interval Problem Update  Appears comfortable, denies pain.  Discussed with case management, hospice referral has been sent with plan to return back to independent living facility    Consultants/Specialty  Trauma surgery  Neurosurgery    Code Status  DNR/DNI    Disposition  Hospice    Review of Systems  Review of Systems   Unable to perform ROS: Other   Constitutional:  Positive for malaise/fatigue. Negative for chills, diaphoresis and fever.   HENT:  Positive for hearing loss. Negative for congestion and sore throat.    Eyes:  Negative for blurred vision.   Respiratory:  Negative for cough, shortness of breath and wheezing.    Cardiovascular:  Negative for chest pain, palpitations and leg swelling.   Gastrointestinal:  Negative for abdominal pain, diarrhea, heartburn, nausea and vomiting.   Genitourinary:  Negative for dysuria, flank pain and hematuria.   Musculoskeletal:  Negative for back pain, joint pain, myalgias and neck  pain.   Skin:  Negative for rash.   Neurological:  Positive for weakness. Negative for dizziness, sensory change, speech change, focal weakness and headaches.   Psychiatric/Behavioral:  The patient is not nervous/anxious.         Physical Exam  Temp:  [36.9 °C (98.4 °F)-37.3 °C (99.1 °F)] 37 °C (98.6 °F)  Pulse:  [55-66] 66  Resp:  [16-17] 16  BP: (138-148)/(57-79) 142/75  SpO2:  [95 %-97 %] 96 %    Physical Exam  Vitals and nursing note reviewed.   HENT:      Head: Normocephalic.      Comments: Laceration at right supraorbital area with stitches  Right facial ecchymosis     Nose: No congestion.      Mouth/Throat:      Mouth: Mucous membranes are moist.   Eyes:      Conjunctiva/sclera: Conjunctivae normal.   Cardiovascular:      Rate and Rhythm: Normal rate. Rhythm irregular.      Heart sounds: Murmur heard.   Pulmonary:      Effort: Pulmonary effort is normal.      Breath sounds: Normal breath sounds.   Abdominal:      General: There is no distension.      Tenderness: There is no abdominal tenderness. There is no guarding or rebound.   Musculoskeletal:      Cervical back: No tenderness.   Skin:     Comments: Skin tear to left forearm   Neurological:      Mental Status: She is alert.      Comments: Oriented to person and place         CAM  Acute onset and fluctuating course   Yes  Inattention                                         No   Disorganized thinking                        Yes  Altered level of consciousness          No     Medication Review    Yes    Laboratory                            Imaging  CT-HEAD W/O   Final Result      1.  Trace RIGHT frontoparietal subdural blood, decreased in size and conspicuity since the prior study   2.  No new hemorrhage   3.  Atrophy   4.  White matter changes         DX-SHOULDER 2+ RIGHT   Final Result         1.  No acute traumatic bony injury.         DX-FOREARM LEFT   Final Result         1.  No acute traumatic bony injury.      DX-HAND 3+ LEFT   Final Result         1.   No acute traumatic bony injury.      CT-HEAD W/O   Final Result         1.  2.6 mm right parietal subdural hematoma   2.  Nonspecific white matter changes, commonly associated with small vessel ischemic disease.  Associated mild cerebral atrophy is noted.   3.  Atherosclerosis.      Based solely on CT findings, the brain injury guideline category is mBIG 1.      SDH < 4mm   IPH < 4mm   SAH < 3 sulci and < 1mm      The original BIG retrospective analysis found radiographic progression in 0% of BIG 1 patients and 2.6% BIG 2.      These findings were discussed with the patient's clinician, JULY DELGADO, on 8/24/2023 2:08 AM.             Assessment/Plan  * Trauma- (present on admission)  Assessment & Plan  Trauma surgery-primary service    Delirium- (present on admission)  Assessment & Plan  Avoid Benzodiazepines and Anticholinergics  Frequent orientation  Open window blinds during the day  Avoid naps during the day  Family at bedside whenever possible  Ensure adequate sleep at night - use Melatonin preferably  Avoid early morning labs  Avoid vital signs during sleep  Ambulate if possible  Provide adequate pain control  Monitor for urinary retention  Prevent and manage constipation  Discontinue IVs, Catheters, Tubes, Lines, Cardiac monitors, SCDs if possible  May need trial of Seroquel    Frequent falls- (present on admission)  Assessment & Plan  Hospice consult    Frailty syndrome in geriatric patient- (present on admission)  Assessment & Plan  Hospice consulted by trauma surgery    Subdural hematoma, post-traumatic (HCC)- (present on admission)  Assessment & Plan  Neurochecks  Keppra for seizure prophylaxis  Neurosurgery following    Hearing loss- (present on admission)  Assessment & Plan  Hearing amplifier/head set    Urinary incontinence- (present on admission)  Assessment & Plan  Bladder scans  Monitor for urinary retention    CKD (chronic kidney disease) stage 3, GFR 30-59 ml/min ([facility])- (present on  admission)  Assessment & Plan  Follow BMP    Facial laceration- (present on admission)  Assessment & Plan  Wound care    Depression- (present on admission)  Assessment & Plan  Remeron    Cognitive impairment- (present on admission)  Assessment & Plan  Hospice consulted    CAD (coronary artery disease)- (present on admission)  Assessment & Plan  Uncertain extent of disease  Metoprolol  Hold ASA  was not on statin    Longstanding persistent atrial fibrillation (HCC)- (present on admission)  Assessment & Plan  Metoprolol  Hold ASA  Not on anticoagulation secondary to frequent falls    Venous insufficiency of both lower extremities- (present on admission)  Assessment & Plan  Monitor    Acquired hypothyroidism- (present on admission)  Assessment & Plan  Synthroid, Cytomel    Essential hypertension- (present on admission)  Assessment & Plan  Metoprolol, Lisinopril         VTE prophylaxis: SCD    Geriatric medicine will sign off at this time.  Please note recommendations on Assessment and Plan.  Thank you for this consult.  Please reconsult if needed.

## 2023-08-28 NOTE — DISCHARGE INSTRUCTIONS
- Call or seek medical attention for questions or concerns  - Follow up with the [facility] RETURN: as needed.  - Follow up with Dr. Pepe in 2 weeks time, avoid all blood thinners including aspirin or NSAIDs (ibuprophen, Advil, Aleve, Motrin) for at least two weeks  - Follow up with primary care provider within one weeks time  - Resume regular diet  - May take over the counter acetaminophen  - Continue daily over the counter stool softener while on narcotics  - No operation of machinery or motorized vehicles while under the influence of narcotics  - No alcohol, marijuana or illicit drug use while under the influence of narcotics  - In the event of a narcotic overdose naloxone (Narcan) is available without a prescription from any [facility] or [facility]  - No swimming, hot tubs, baths or wound submersion until cleared by outpatient provider. May shower  - No contact sports, strenuous activities, or heavy lifting until cleared by outpatient provider  - If respiratory decompensation, persistent or worsening pain, or signs or symptoms of infection occur seek medical attention

## 2023-08-28 NOTE — CARE PLAN
The patient is Stable - Low risk of patient condition declining or worsening    Shift Goals  Clinical Goals: Monitor neuro status, safety  Patient Goals: Sleep  Family Goals: GIGI    Progress made toward(s) clinical / shift goals:    Problem: Fall Risk  Goal: Patient will remain free from falls  Outcome: Progressing   Fall precautions in place. Bed locked and in lowest position, bed alarm in place, treaded socks used when ambulated, call light within reach. Pt educated to call for assistance. Pt rounded on frequently throughout shift.     Problem: Neuro Status  Goal: Neuro status will remain stable or improve  Outcome: Progressing   Q4hr neuro checks. Neuro status remains stable.    Problem: Pain - Standard  Goal: Alleviation of pain or a reduction in pain to the patient’s comfort goal  Outcome: Progressing   Pt's pain assessed throughout shift. Patient offered pharmacological and non-pharmacological interventions.      Patient is not progressing towards the following goals:

## 2023-08-28 NOTE — DISCHARGE PLANNING
Received Choice Form @: 501  Agency/ Facility Name: [facility]  Referral Sent per Choice Form @: 254

## 2023-08-29 PROCEDURE — 770001 HCHG ROOM/CARE - MED/SURG/GYN PRIV*

## 2023-08-29 PROCEDURE — 700102 HCHG RX REV CODE 250 W/ 637 OVERRIDE(OP)

## 2023-08-29 PROCEDURE — A9270 NON-COVERED ITEM OR SERVICE: HCPCS

## 2023-08-29 PROCEDURE — 99231 SBSQ HOSP IP/OBS SF/LOW 25: CPT | Performed by: REGISTERED NURSE

## 2023-08-29 PROCEDURE — 700111 HCHG RX REV CODE 636 W/ 250 OVERRIDE (IP)

## 2023-08-29 RX ADMIN — LEVOTHYROXINE SODIUM 112 MCG: 0.11 TABLET ORAL at 06:21

## 2023-08-29 RX ADMIN — LEVETIRACETAM 500 MG: 500 TABLET, FILM COATED ORAL at 16:41

## 2023-08-29 RX ADMIN — DOCUSATE SODIUM 100 MG: 100 CAPSULE, LIQUID FILLED ORAL at 06:21

## 2023-08-29 RX ADMIN — METOPROLOL TARTRATE 50 MG: 25 TABLET, FILM COATED ORAL at 06:19

## 2023-08-29 RX ADMIN — POLYETHYLENE GLYCOL 3350 1 PACKET: 17 POWDER, FOR SOLUTION ORAL at 06:30

## 2023-08-29 RX ADMIN — MIRTAZAPINE 7.5 MG: 15 TABLET, FILM COATED ORAL at 20:41

## 2023-08-29 RX ADMIN — MAGNESIUM HYDROXIDE 30 ML: 400 SUSPENSION ORAL at 06:00

## 2023-08-29 RX ADMIN — LIOTHYRONINE SODIUM 5 MCG: 5 TABLET ORAL at 06:20

## 2023-08-29 RX ADMIN — LEVETIRACETAM 500 MG: 500 TABLET, FILM COATED ORAL at 06:21

## 2023-08-29 RX ADMIN — LISINOPRIL 10 MG: 10 TABLET ORAL at 06:20

## 2023-08-29 RX ADMIN — ACETAMINOPHEN 650 MG: 325 TABLET, FILM COATED ORAL at 06:22

## 2023-08-29 ASSESSMENT — ENCOUNTER SYMPTOMS
CONSTITUTIONAL NEGATIVE: 1
HEADACHES: 0
PSYCHIATRIC NEGATIVE: 1
DOUBLE VISION: 0
MUSCULOSKELETAL NEGATIVE: 1
CARDIOVASCULAR NEGATIVE: 1
GASTROINTESTINAL NEGATIVE: 1
DIZZINESS: 0
BLURRED VISION: 0
RESPIRATORY NEGATIVE: 1
FOCAL WEAKNESS: 0
EYES NEGATIVE: 1
SPEECH CHANGE: 0

## 2023-08-29 ASSESSMENT — PAIN DESCRIPTION - PAIN TYPE
TYPE: ACUTE PAIN

## 2023-08-29 NOTE — CARE PLAN
The patient is Stable - Low risk of patient condition declining or worsening    Shift Goals  Clinical Goals: safety, stable neuro status  Patient Goals: rest  Family Goals: norberto    Progress made toward(s) clinical / shift goals:    Problem: Knowledge Deficit - Standard  Goal: Patient and family/care givers will demonstrate understanding of plan of care, disease process/condition, diagnostic tests and medications  Outcome: Progressing     Problem: Skin Integrity  Goal: Skin integrity is maintained or improved  Outcome: Progressing     Problem: Fall Risk  Goal: Patient will remain free from falls  Outcome: Progressing     Problem: Neuro Status  Goal: Neuro status will remain stable or improve  Outcome: Progressing       Patient is not progressing towards the following goals:

## 2023-08-29 NOTE — PROGRESS NOTES
Trauma / Surgical Daily Progress Note    Date of Service  8/29/2023    Chief Complaint  89 y.o. female admitted 8/24/2023 with ICH after a fall.    Interval Events  Accepted at [facility].  Will transfer on 8/30, with COVID swab the day off.  No acute events over night.     Review of Systems  Review of Systems   Constitutional: Negative.    Eyes: Negative.  Negative for blurred vision and double vision.   Respiratory: Negative.     Cardiovascular: Negative.    Gastrointestinal: Negative.    Genitourinary: Negative.    Musculoskeletal: Negative.    Neurological:  Negative for dizziness, speech change, focal weakness and headaches.   Psychiatric/Behavioral: Negative.     All other systems reviewed and are negative.       Vital Signs  Temp:  [36.6 °C (97.9 °F)-37 °C (98.6 °F)] 36.6 °C (97.9 °F)  Pulse:  [60-71] 60  Resp:  [15-17] 17  BP: (130-164)/(63-82) 155/82  SpO2:  [94 %-97 %] 94 %    Physical Exam  Physical Exam  Vitals and nursing note reviewed.   Constitutional:       General: She is awake. She is not in acute distress.  HENT:      Head: Normocephalic and atraumatic.      Comments: Laceration right orbit, well approximated.   Periorbital ecchymosis, contusion, right side.      Nose: Nose normal.      Mouth/Throat:      Mouth: Mucous membranes are moist.      Pharynx: Oropharynx is clear.   Eyes:      Extraocular Movements: Extraocular movements intact.      Conjunctiva/sclera:      Right eye: Right conjunctiva is injected.      Left eye: Left conjunctiva is injected.      Pupils: Pupils are equal, round, and reactive to light.   Cardiovascular:      Rate and Rhythm: Normal rate.      Pulses: Normal pulses.   Pulmonary:      Effort: Pulmonary effort is normal. No respiratory distress.   Abdominal:      General: Abdomen is flat. Bowel sounds are normal.      Palpations: Abdomen is soft.   Musculoskeletal:         General: Normal range of motion.      Cervical back: Full passive range of motion without  pain, normal range of motion and neck supple.   Skin:     General: Skin is warm and dry.      Capillary Refill: Capillary refill takes less than 2 seconds.      Comments: Avulsion left arm well approximated with steri strips.   Moves all extremities.    Neurological:      General: No focal deficit present.      Mental Status: She is alert and oriented to person, place, and time. Mental status is at baseline.      GCS: GCS eye subscore is 4. GCS verbal subscore is 5. GCS motor subscore is 6.      Sensory: Sensation is intact.      Motor: Motor function is intact.   Psychiatric:         Mood and Affect: Mood normal.       Laboratory  No results found for this or any previous visit (from the past 24 hour(s)).      Fluids    Intake/Output Summary (Last 24 hours) at 8/29/2023 1057  Last data filed at 8/28/2023 1300  Gross per 24 hour   Intake 120 ml   Output --   Net 120 ml         Core Measures & Quality Metrics  Radiology images reviewed, Labs reviewed and Medications reviewed  Vargas catheter: No Vargas      DVT Prophylaxis: Contraindicated - High bleeding risk    Ulcer prophylaxis: Not indicated    Assessed for rehab: Patient was assess for and/or received rehabilitation services during this hospitalization    RAP Score Total: 9    CAGE Results: not completed Blood Alcohol>0.08: no       Assessment/Plan  * Trauma- (present on admission)  Assessment & Plan  Ground level fall.   Non Trauma Activation.  Sita Eddy MD. Trauma Surgery.    Hospice care  Assessment & Plan  Hospice referral sent to [facility].  No labs, IV fluids, or enteral feeds per POLST. DNR/DNI.    Subdural hematoma, post-traumatic (HCC)- (present on admission)  Assessment & Plan  2.6 mm right parietal subdural hematoma.  Non-operative management.   Interval head CT stable showing decrease size in subdural.  Post traumatic pharmacologic seizure prophylaxis for 1 week.  Speech Language Pathology cognitive evaluation.  Alicia Pepe MD.  Neurosurgeon. [facility].    Encounter for geriatric assessment- (present on admission)  Assessment & Plan  8/24 Geriatric medicine following.  Dr. Dontrell Kessler.    Avulsion of arm, left, initial encounter- (present on admission)  Assessment & Plan  Wound care per emergency department.     Facial laceration- (present on admission)  Assessment & Plan  Laceration over right eye brow.   Suture closure in ED.     Contraindication to deep vein thrombosis (DVT) prophylaxis- (present on admission)  Assessment & Plan  VTE prophylaxis initially contraindicated secondary to elevated bleeding risk.    Antiplatelet or antithrombotic long-term use- (present on admission)  Assessment & Plan  Daily aspirin use.   Admission TEG AA 37%, no platelets indicated.     Cognitive impairment- (present on admission)  Assessment & Plan  Baseline. Unable to confirm medical history or medications.   Formal cognitive evaluation pending.     CAD (coronary artery disease)- (present on admission)  Assessment & Plan  Chronic condition treated with aspirin.  Holding maintenance medication during acute traumatic illness.    Longstanding persistent atrial fibrillation (HCC)- (present on admission)  Assessment & Plan  Chronic condition treated with metoprolol and aspirin.  Systemic anticoagulation held on admission.  Resume metoprolol on admission.    Acquired hypothyroidism- (present on admission)  Assessment & Plan  Chronic condition treated with levothyroxine and liothyronine.  Resumed maintenance medication on admission.    Essential hypertension- (present on admission)  Assessment & Plan  Chronic condition treated with lisinopril.  Resumed maintenance medication on admission.        Discussed patient condition with RN, Patient, and trauma surgery .

## 2023-08-29 NOTE — CARE PLAN
The patient is Stable - Low risk of patient condition declining or worsening    Shift Goals  Clinical Goals: Safety, stable neuro status  Patient Goals: Sleep  Family Goals: GIGI    Progress made toward(s) clinical / shift goals:  ***    Patient is not progressing towards the following goals:

## 2023-08-29 NOTE — CARE PLAN
The patient is Stable - Low risk of patient condition declining or worsening    Shift Goals  Clinical Goals: Stable neuro status, comfort, safety  Patient Goals: Rest  Family Goals: GIGI    Progress made toward(s) clinical / shift goals:    Problem: Neuro Status  Goal: Neuro status will remain stable or improve  Description: Target End Date:  Prior to discharge or change in level of care    Document on Neuro assessment in the Assessment flowsheet    1.  Assess and monitor neurologic status per provider order/protocol/unit policy  2.  Assess level of consciousness and orientation  3.  Assess for speech, dysarthria, dysphagia, facial symmetry  4.  Assess visual field, eye movements, gaze preference, pupil reaction and size  5.  Assess muscle strength and motor response in all four extremities  6.  Assess for sensation (numbness and tingling)  7.  Assess basic neuro reflexes (cough, gag, corneal)  8.  Identify changes in neuro status and report to provider for testing/treatment orders  Outcome: Progressing     Problem: Bowel Elimination  Goal: Establish and maintain regular bowel function  Description: Target End Date:  Prior to discharge or change in level of care    1.   Note date of last BM  2.   Educate about diet, fluid intake, medication and activity to promote bowel function  3.   Educate signs and symptoms of constipation and interventions to implement  4.   Pharmacologic bowel management per provider order  5.   Regular toileting schedule  6.   Upright position for toileting  7.   High fiber diet  8.   Encourage hydration  9.   Collaborate with Clinical Dietician  10. Care and maintenance of ostomy if applicable  Outcome: Progressing       Patient is not progressing towards the following goals:

## 2023-08-29 NOTE — DISCHARGE PLANNING
Per Nasrin with [facility], [facility] /Neelam requesting COVID-19 test on day of DC.      **1117 Hrs - Voalte message sent to Trauma NP, Viktoriya, informing her of above and requesting order for COVID-19 test early tomorrow as transport is scheduled for 10 AM.

## 2023-08-30 ENCOUNTER — PATIENT OUTREACH (OUTPATIENT)
Dept: SCHEDULING | Facility: IMAGING CENTER | Age: 88
End: 2023-08-30
Payer: MEDICARE

## 2023-08-30 VITALS
WEIGHT: 116.18 LBS | HEIGHT: 65 IN | DIASTOLIC BLOOD PRESSURE: 69 MMHG | HEART RATE: 62 BPM | OXYGEN SATURATION: 96 % | SYSTOLIC BLOOD PRESSURE: 137 MMHG | BODY MASS INDEX: 19.36 KG/M2 | TEMPERATURE: 97.7 F | RESPIRATION RATE: 18 BRPM

## 2023-08-30 LAB
SARS-COV-2 RNA RESP QL NAA+PROBE: NOTDETECTED
SPECIMEN SOURCE: NORMAL

## 2023-08-30 PROCEDURE — 87635 SARS-COV-2 COVID-19 AMP PRB: CPT

## 2023-08-30 PROCEDURE — 99239 HOSP IP/OBS DSCHRG MGMT >30: CPT

## 2023-08-30 NOTE — CARE PLAN
"The patient is Stable - Low risk of patient condition declining or worsening    Shift Goals  Clinical Goals: DC to [facility]  Patient Goals: \"Nothing\"  Family Goals: GIGI    Progress made toward(s) clinical / shift goals:  Patient progressing well. Up to bathroom with FWW. Patient to discharge back to [facility] facility at 1000 today.           "

## 2023-08-30 NOTE — DISCHARGE PLANNING
COVID-19 test negative.   [facility] transport to [facility] on Hospice Care with [facility] scheduled for 10 AM.      **0847 Hrs - COVID results & DC Summary faxed to Neelam @ [facility] (Fax #[phone]).   CM called patient's daughter, Shania #[phone], to update her on DCP and informed her spouse of scheduled transfer today @ 10 AM as Shania was not available to answer call.

## 2023-11-29 ENCOUNTER — PATIENT MESSAGE (OUTPATIENT)
Dept: HEALTH INFORMATION MANAGEMENT | Facility: OTHER | Age: 88
End: 2023-11-29

## 2024-06-11 ENCOUNTER — APPOINTMENT (OUTPATIENT)
Dept: RADIOLOGY | Facility: MEDICAL CENTER | Age: 89
End: 2024-06-11
Attending: EMERGENCY MEDICINE
Payer: MEDICARE

## 2024-06-11 ENCOUNTER — HOSPITAL ENCOUNTER (EMERGENCY)
Facility: MEDICAL CENTER | Age: 89
End: 2024-06-11
Attending: EMERGENCY MEDICINE
Payer: MEDICARE

## 2024-06-11 VITALS
RESPIRATION RATE: 17 BRPM | BODY MASS INDEX: 18.33 KG/M2 | HEIGHT: 65 IN | HEART RATE: 55 BPM | DIASTOLIC BLOOD PRESSURE: 77 MMHG | WEIGHT: 110 LBS | SYSTOLIC BLOOD PRESSURE: 179 MMHG | OXYGEN SATURATION: 98 %

## 2024-06-11 DIAGNOSIS — S01.81XA FACIAL LACERATION, INITIAL ENCOUNTER: ICD-10-CM
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
## 2024-06-11 DIAGNOSIS — S09.90XA CLOSED HEAD INJURY, INITIAL ENCOUNTER: ICD-10-CM

## 2024-06-11 LAB
ALBUMIN SERPL BCP-MCNC: 3.6 G/DL (ref 3.2–4.9)
ALBUMIN/GLOB SERPL: 1.4 G/DL
ALP SERPL-CCNC: 104 U/L (ref 30–99)
ALT SERPL-CCNC: 10 U/L (ref 2–50)
ANION GAP SERPL CALC-SCNC: 14 MMOL/L (ref 7–16)
APTT PPP: 30.5 SEC (ref 24.7–36)
AST SERPL-CCNC: 17 U/L (ref 12–45)
BASOPHILS # BLD AUTO: 0.6 % (ref 0–1.8)
BASOPHILS # BLD: 0.03 K/UL (ref 0–0.12)
BILIRUB SERPL-MCNC: 0.2 MG/DL (ref 0.1–1.5)
BUN SERPL-MCNC: 14 MG/DL (ref 8–22)
CALCIUM ALBUM COR SERPL-MCNC: 8.7 MG/DL (ref 8.5–10.5)
CALCIUM SERPL-MCNC: 8.4 MG/DL (ref 8.5–10.5)
CHLORIDE SERPL-SCNC: 103 MMOL/L (ref 96–112)
CO2 SERPL-SCNC: 19 MMOL/L (ref 20–33)
CREAT SERPL-MCNC: 0.72 MG/DL (ref 0.5–1.4)
EKG IMPRESSION: NORMAL
EOSINOPHIL # BLD AUTO: 0.05 K/UL (ref 0–0.51)
EOSINOPHIL NFR BLD: 1.1 % (ref 0–6.9)
ERYTHROCYTE [DISTWIDTH] IN BLOOD BY AUTOMATED COUNT: 48.7 FL (ref 35.9–50)
GFR SERPLBLD CREATININE-BSD FMLA CKD-EPI: 78 ML/MIN/1.73 M 2
GLOBULIN SER CALC-MCNC: 2.6 G/DL (ref 1.9–3.5)
GLUCOSE SERPL-MCNC: 161 MG/DL (ref 65–99)
HCT VFR BLD AUTO: 35.6 % (ref 37–47)
HGB BLD-MCNC: 10.9 G/DL (ref 12–16)
IMM GRANULOCYTES # BLD AUTO: 0.01 K/UL (ref 0–0.11)
IMM GRANULOCYTES NFR BLD AUTO: 0.2 % (ref 0–0.9)
INR PPP: 1.14 (ref 0.87–1.13)
LYMPHOCYTES # BLD AUTO: 1.06 K/UL (ref 1–4.8)
LYMPHOCYTES NFR BLD: 22.6 % (ref 22–41)
MCH RBC QN AUTO: 30.3 PG (ref 27–33)
MCHC RBC AUTO-ENTMCNC: 30.6 G/DL (ref 32.2–35.5)
MCV RBC AUTO: 98.9 FL (ref 81.4–97.8)
MONOCYTES # BLD AUTO: 0.34 K/UL (ref 0–0.85)
MONOCYTES NFR BLD AUTO: 7.2 % (ref 0–13.4)
NEUTROPHILS # BLD AUTO: 3.21 K/UL (ref 1.82–7.42)
NEUTROPHILS NFR BLD: 68.3 % (ref 44–72)
NRBC # BLD AUTO: 0 K/UL
NRBC BLD-RTO: 0 /100 WBC (ref 0–0.2)
PLATELET # BLD AUTO: 227 K/UL (ref 164–446)
PMV BLD AUTO: 10 FL (ref 9–12.9)
POTASSIUM SERPL-SCNC: 3.8 MMOL/L (ref 3.6–5.5)
PROT SERPL-MCNC: 6.2 G/DL (ref 6–8.2)
PROTHROMBIN TIME: 14.7 SEC (ref 12–14.6)
RBC # BLD AUTO: 3.6 M/UL (ref 4.2–5.4)
SODIUM SERPL-SCNC: 136 MMOL/L (ref 135–145)
TROPONIN T SERPL-MCNC: 10 NG/L (ref 6–19)
WBC # BLD AUTO: 4.7 K/UL (ref 4.8–10.8)

## 2024-06-11 PROCEDURE — 70450 CT HEAD/BRAIN W/O DYE: CPT

## 2024-06-11 PROCEDURE — 304999 HCHG REPAIR-SIMPLE/INTERMED LEVEL 1

## 2024-06-11 PROCEDURE — 85730 THROMBOPLASTIN TIME PARTIAL: CPT

## 2024-06-11 PROCEDURE — 85610 PROTHROMBIN TIME: CPT

## 2024-06-11 PROCEDURE — 36415 COLL VENOUS BLD VENIPUNCTURE: CPT

## 2024-06-11 PROCEDURE — 85025 COMPLETE CBC W/AUTO DIFF WBC: CPT

## 2024-06-11 PROCEDURE — 304217 HCHG IRRIGATION SYSTEM

## 2024-06-11 PROCEDURE — 71045 X-RAY EXAM CHEST 1 VIEW: CPT

## 2024-06-11 PROCEDURE — 303747 HCHG EXTRA SUTURE

## 2024-06-11 PROCEDURE — 99285 EMERGENCY DEPT VISIT HI MDM: CPT

## 2024-06-11 PROCEDURE — 93005 ELECTROCARDIOGRAM TRACING: CPT | Performed by: EMERGENCY MEDICINE

## 2024-06-11 PROCEDURE — 700101 HCHG RX REV CODE 250: Performed by: EMERGENCY MEDICINE

## 2024-06-11 PROCEDURE — 84484 ASSAY OF TROPONIN QUANT: CPT

## 2024-06-11 PROCEDURE — 80053 COMPREHEN METABOLIC PANEL: CPT

## 2024-06-11 PROCEDURE — 72125 CT NECK SPINE W/O DYE: CPT

## 2024-06-11 RX ORDER — LIDOCAINE HYDROCHLORIDE AND EPINEPHRINE 10; 10 MG/ML; UG/ML
10 INJECTION, SOLUTION INFILTRATION; PERINEURAL ONCE
Status: COMPLETED | OUTPATIENT
Start: 2024-06-11 | End: 2024-06-11

## 2024-06-11 RX ADMIN — LIDOCAINE HYDROCHLORIDE AND EPINEPHRINE 10 ML: 10; 10 INJECTION, SOLUTION INFILTRATION; PERINEURAL at 10:50

## 2024-06-11 ASSESSMENT — FIBROSIS 4 INDEX: FIB4 SCORE: 1.6

## 2024-06-11 NOTE — ED TRIAGE NOTES
Chief Complaint   Patient presents with    T-5000 FALL     BIB [facility] from [facility]. Patient was in her wheelchair when they heard a thud and found patient on floor with laceration to left forehead. -LOC, -thinners. PMH Dementia baseline AAOx1. EMS placed IV and gave 1G tylenol

## 2024-06-11 NOTE — ED PROVIDER NOTES
ED Provider Note    Scribed for Gisel Ascencio M.D. by Jamil Aldridge. 6/11/2024, 9:41 AM.    Primary care provider: WALDO Paz  Means of arrival: EMS  History obtained from: EMS  History limited by: History of dementia    CHIEF COMPLAINT  Chief Complaint   Patient presents with    T-5000 FALL     BIB [facility] from [facility]. Patient was in her wheelchair when they heard a thud and found patient on floor with laceration to left forehead. -LOC, -thinners. PMH Dementia baseline AAOx1. EMS placed IV and gave 1G tylenol       HPI/SHAKEEL  Tracie Arauz is a 89 y.o. female who presents to the Emergency Department via EMS as TBI Activation. Per EMS, patient was heard falling to the ground by staff at [facility] where she lives. She was found to have a laceration to her forehead. Unknown loss of consciousness. Patient has a history of dementia, is currently A&Ox1 which is reportedly her baseline. FSBS was 219, no known history of diabetes.     Additional history was obtained from ED RN who spoke with patient's hospice RN at [facility]. The patient reportedly has had multiple falls over the last couple days. She has a history of hypertension, afrib, and hypothyroidism. She is not anticoagulated due to prior subdural hematoma and frequent falls.  Per nurse she is on hospice due to severe dementia.    EXTERNAL RECORDS REVIEWED  Last hospitalist on 08/24/23 with subdural hematoma. At that time she had a POLST which showed he was DNR/DNI.     LIMITATION TO HISTORY   History of dementia    OUTSIDE HISTORIAN(S):  EMS as above      PAST MEDICAL HISTORY   has a past medical history of Acute conjunctivitis of both eyes (4/27/2023) and Acute cystitis without hematuria (12/27/2022).    SURGICAL HISTORY   has a past surgical history that includes hysterectomy, total abdominal.    SOCIAL HISTORY  Social History     Tobacco Use    Smoking status: Former     Current packs/day: 0.00     Types: Cigarettes     Quit date:  "1985     Years since quittin.6    Smokeless tobacco: Never   Vaping Use    Vaping status: Never Used   Substance Use Topics    Alcohol use: No    Drug use: Never      Social History     Substance and Sexual Activity   Drug Use Never       FAMILY HISTORY  History reviewed. No pertinent family history.    CURRENT MEDICATIONS  Home Medications       Reviewed by Jada Mccracken R.N. (Registered Nurse) on 24 at 0958  Med List Status: Partial     Medication Last Dose Status        Patient Bobby Taking any Medications                         Audit from Redirected Encounters    **Home medications have not yet been reviewed for this encounter**         ALLERGIES  Allergies   Allergen Reactions    Duricef [Cefadroxil]      \"Rash\"       PHYSICAL EXAM  VITAL SIGNS: BP (!) 213/93   Pulse 67   Resp 16   Ht 1.651 m (5' 5\")   Wt 49.9 kg (110 lb)   SpO2 98%   BMI 18.30 kg/m²   Vitals reviewed by myself.  Physical Exam  Nursing note and vitals reviewed.  Constitutional: Well-developed and well-nourished. No acute distress.  HENT: 5 cm complex, macerated, avulsion-type laceration to left forehead. Head is normocephalic.  Eyes: extra-ocular movements intact  Cardiovascular: Regular rate and regular rhythm. No murmur heard.  Pulmonary/Chest: Breath sounds normal. No wheezes or rales.   Abdominal: Soft and non-tender. No distention.    Musculoskeletal: Extremities exhibit normal range of motion without edema or tenderness.   Neurological: Awake and alert  Skin: Skin is warm and dry. No rash.       DIAGNOSTIC STUDIES:  LABS  Labs Reviewed   CBC WITH DIFFERENTIAL - Abnormal; Notable for the following components:       Result Value    WBC 4.7 (*)     RBC 3.60 (*)     Hemoglobin 10.9 (*)     Hematocrit 35.6 (*)     MCV 98.9 (*)     MCHC 30.6 (*)     All other components within normal limits   COMP METABOLIC PANEL - Abnormal; Notable for the following components:    Co2 19 (*)     Glucose 161 (*)     Calcium 8.4 (*)     " Alkaline Phosphatase 104 (*)     All other components within normal limits   PROTHROMBIN TIME - Abnormal; Notable for the following components:    PT 14.7 (*)     INR 1.14 (*)     All other components within normal limits   TROPONIN   APTT   ESTIMATED GFR       All labs reviewed and independently interpreted by myself    EKG Interpretation:    12 Lead EKG independently interpreted by myself to show:  EKG at 9:57 AM: Normal sinus rhythm, heart rate 67, left axis deviation, normal intervals, , QRS 77, QTc 473, no acute ST-T segment changes, no evidence of acute asthma or ischemia per my independent interpretation    RADIOLOGY  Images independently interpreted by myself:  -CT of the head demonstrates no acute intracranial hemorrhage    Final interpretation by radiology demonstrates:    DX-CHEST-PORTABLE (1 VIEW)   Final Result      No acute cardiopulmonary abnormality.      CT-CSPINE WITHOUT PLUS RECONS   Final Result      No acute fracture or dislocation of the cervical spine.      CT-HEAD W/O   Final Result      1.  Cerebral atrophy and chronic microvascular ischemic type changes.   2.  No acute intracranial abnormality.   3.  Small left forehead contusion.           The radiologist's interpretation of all radiological studies have been reviewed by me.      PROCEDURES  Laceration Repair Procedure Note    Indication: Laceration    Procedure: The patient was placed in the appropriate position and anesthesia around the laceration was obtained by infiltration using 1% Lidocaine with epinephrine. The wound was minimally contaminated .The area was then irrigated with high pressure normal saline. The laceration was closed with 4-0 Ethilon using interrupted sutures. There were no additional lacerations requiring repair. The wound area was then dressed with bacitracin and a sterile dressing.      Total repaired wound length: 5 cm.     Other Items: Suture count: 11    The patient tolerated the procedure  well.    Complications: None      COURSE & MEDICAL DECISION MAKING      INITIAL ASSESSMENT, ED COURSE AND PLAN    Patient is a 89-year-old female who presents for evaluation after a fall.  Differential diagnosis includes mechanical fall, syncope, electrolyte derangement, anemia, close head injury, intracranial hemorrhage.  Diagnostic workup includes labs, EKG and CT of the head and neck.    Patient's initial vitals are notable for slight hypertension, she does have an underlying history of hypertension.  CT of the head returns demonstrates no acute intracranial hemorrhage.  EKG returns demonstrates no evidence of acute AMI or ischemia.  CT of the C-spine is negative for injury.  Labs are notable for mild anemia with a hemoglobin of 10.9, this is around patient's baseline.  Electrolytes are unremarkable.  Troponin is within normal limits.  At this time unclear etiology of patient's frequent falls although I suspect likely progression of her dementia and debility.  Had a discussion with her hospice nurse and daughter and they are both amenable to discharge at this time with plan to go back to [facility] home as patient is on hospice and her  goals are comfort at this time.  Laceration is repaired and patient is discharged back home in stable condition.       REASSESSMENTS   9:41 AM - Patient seen and examined at charge desk at [facility]. Ordered labs, imaging.     9:53 AM - Chart review showed the patient had a POLST stating she was DNR/DNI on her last admission in August of 2023. Attempted to call all of her emergency contacts on file though was unsuccessful.     10:40 AM - Patient reassessed at bedside; plan for laceration repair.     10:49 AM - Laceration repair performed at this time as above.     11:04 AM - Spoke with Ayla WEBSTER, patient's hospice nurse. Plan for discharge back to [facility].    11:08 AM-spoke with patient's daughter who agrees with plan of care        DISPOSITION AND DISCUSSIONS  I have discussed management  of the patient with the following physicians and JASBIR's:  None    Discussion of management with other [facility] or appropriate source(s): None     Escalation of care considered, and ultimately not performed:see above    Barriers to care at this time, including but not limited to: none.     Decision tools and prescription drugs considered including, but not limited to: see above.    Please see review of records as noted above    The patient will return for new or worsening symptoms and is stable at the time of discharge.    The patient is referred to a primary physician for blood pressure management, diabetic screening, and for all other preventative health concerns.    DISPOSITION:  Patient will be discharged home in stable condition.    FOLLOW UP:  WALDO Paz  [address] [address]  [phone]            OUTPATIENT MEDICATIONS:  New Prescriptions    No medications on file       FINAL IMPRESSION  1. Fall, initial encounter    2. Closed head injury, initial encounter    3. Facial laceration, initial encounter          Jamil TILLMAN (Scribe), am scribing for, and in the presence of, Gisel Ascencio M.D..    Electronically signed by: Jamil Aldridge (Scribmakenna), 6/11/2024    Gisel TILLMAN M.D. personally performed the services described in this documentation, as scribed by Jamil Aldridge in my presence, and it is both accurate and complete.    The note accurately reflects work and decisions made by me.  Gisel Ascencio M.D.  6/11/2024  12:46 PM

## 2024-06-11 NOTE — DISCHARGE PLANNING
Nasrin [phone] from [facility] called with transportation information:     [facility] will pick Pt up for Wheelchair transport at 1600.  Per Nasrin [facility] is aware of transportation time.     CAROL will update DARIN.

## 2024-06-11 NOTE — DISCHARGE PLANNING
Per ERP request, CAROL obtained information that pt comes from [facility] and is established with [facility].  [facility] provided pts daughter, Shania's primary cell phone number.  Phone numbers provided to [facility].    [facility]: [phone]  [facility]: [phone]  Shania Davonte: [phone]    SW received update from [facility] that hospitals would set up transportation for pt to go back to .  CAROL called hospitals-  did have an update, will call us back once she has an update.    Nasrin from hospitals called and spoke with CAROL-   [phone], CAROL confirmed pt is ready for discharge and is wheelchair bound.  Ruchi stated she would arrange for transportation and call SW back to provided details regarding [place] [facility]  time.

## 2024-06-11 NOTE — ED NOTES
Patient discharged per orders. IV removed -bandage applied. Wristband removed per protocol. Pt taken via [facility] in wheelchair.  Pt leaving in stable condition with all belongings.

## 2024-06-11 NOTE — ED NOTES
Report from Hospice RN, Ayla ([phone]). Pt from [facility]. Hx of HTN, Afib and hypothyroidism. Pt not on blood thinner per Ayla and pt has been having more frequent falls.

## 2024-08-15 NOTE — ASSESSMENT & PLAN NOTE
Ground level fall.   Non Trauma Activation.  Sita Eddy MD. Trauma Surgery.   Please sign and close.